# Patient Record
Sex: FEMALE | Race: ASIAN | NOT HISPANIC OR LATINO | Employment: FULL TIME | URBAN - METROPOLITAN AREA
[De-identification: names, ages, dates, MRNs, and addresses within clinical notes are randomized per-mention and may not be internally consistent; named-entity substitution may affect disease eponyms.]

---

## 2023-08-14 PROBLEM — E05.90 HYPERTHYROIDISM: Status: ACTIVE | Noted: 2023-08-14

## 2023-08-14 PROBLEM — M54.2 NECK PAIN: Status: ACTIVE | Noted: 2023-08-14

## 2023-08-14 PROBLEM — E78.5 HYPERLIPIDEMIA: Status: ACTIVE | Noted: 2023-08-14

## 2023-08-14 PROBLEM — H53.8 BLURRING OF VISUAL IMAGE: Status: ACTIVE | Noted: 2023-08-14

## 2023-08-14 PROBLEM — N39.0 RECURRENT UTI: Status: ACTIVE | Noted: 2018-10-24

## 2023-08-14 PROBLEM — R94.31 ABNORMAL EKG: Status: ACTIVE | Noted: 2023-08-14

## 2023-08-14 PROBLEM — R68.89 IMPAIRED EXERCISE TOLERANCE: Status: ACTIVE | Noted: 2023-08-14

## 2023-08-14 PROBLEM — N32.81 OVERACTIVE BLADDER: Status: ACTIVE | Noted: 2023-08-14

## 2023-08-14 PROBLEM — N95.1 MENOPAUSAL SYMPTOMS: Status: ACTIVE | Noted: 2023-08-14

## 2023-08-14 PROBLEM — D72.819 LEUKOPENIA: Status: ACTIVE | Noted: 2023-08-14

## 2023-08-14 PROBLEM — R06.02 SHORTNESS OF BREATH: Status: ACTIVE | Noted: 2023-08-14

## 2023-08-14 PROBLEM — R63.4 WEIGHT LOSS: Status: ACTIVE | Noted: 2023-08-14

## 2023-08-14 RX ORDER — METHIMAZOLE 10 MG/1
TABLET ORAL
COMMUNITY
Start: 2021-04-14 | End: 2024-03-11 | Stop reason: ALTCHOICE

## 2023-08-14 RX ORDER — ATENOLOL 25 MG/1
TABLET ORAL
COMMUNITY
Start: 2021-04-05 | End: 2024-03-11 | Stop reason: WASHOUT

## 2023-08-14 RX ORDER — ACETAMINOPHEN 500 MG
500 TABLET ORAL EVERY 8 HOURS PRN
COMMUNITY
Start: 2017-12-15 | End: 2024-03-11 | Stop reason: ALTCHOICE

## 2023-08-14 RX ORDER — ROSUVASTATIN CALCIUM 5 MG/1
1 TABLET, COATED ORAL DAILY
COMMUNITY
Start: 2022-10-18 | End: 2024-03-11 | Stop reason: WASHOUT

## 2023-08-15 ENCOUNTER — OFFICE VISIT (OUTPATIENT)
Dept: PRIMARY CARE | Facility: CLINIC | Age: 63
End: 2023-08-15
Payer: COMMERCIAL

## 2023-08-15 ENCOUNTER — LAB (OUTPATIENT)
Dept: LAB | Facility: LAB | Age: 63
End: 2023-08-15
Payer: COMMERCIAL

## 2023-08-15 VITALS
OXYGEN SATURATION: 97 % | SYSTOLIC BLOOD PRESSURE: 122 MMHG | HEART RATE: 74 BPM | WEIGHT: 180 LBS | HEIGHT: 67 IN | TEMPERATURE: 97.1 F | BODY MASS INDEX: 28.25 KG/M2 | DIASTOLIC BLOOD PRESSURE: 72 MMHG

## 2023-08-15 DIAGNOSIS — E05.90 HYPERTHYROIDISM: Primary | ICD-10-CM

## 2023-08-15 DIAGNOSIS — E78.5 HYPERLIPIDEMIA, UNSPECIFIED HYPERLIPIDEMIA TYPE: ICD-10-CM

## 2023-08-15 DIAGNOSIS — Z12.31 SCREENING MAMMOGRAM, ENCOUNTER FOR: ICD-10-CM

## 2023-08-15 DIAGNOSIS — E05.90 HYPERTHYROIDISM: ICD-10-CM

## 2023-08-15 PROCEDURE — 80053 COMPREHEN METABOLIC PANEL: CPT

## 2023-08-15 PROCEDURE — 99213 OFFICE O/P EST LOW 20 MIN: CPT | Performed by: INTERNAL MEDICINE

## 2023-08-15 PROCEDURE — 85025 COMPLETE CBC W/AUTO DIFF WBC: CPT

## 2023-08-15 PROCEDURE — 84443 ASSAY THYROID STIM HORMONE: CPT

## 2023-08-15 PROCEDURE — 80061 LIPID PANEL: CPT

## 2023-08-15 PROCEDURE — 36415 COLL VENOUS BLD VENIPUNCTURE: CPT

## 2023-08-15 NOTE — PROGRESS NOTES
"Subjective   Patient ID: Marian Garcia is a 63 y.o. female who presents for Follow-up (Pt is due for mammogram order).    HPI follow up leukopenia, hyperlipidemia, thyroid abnormalities  Adamant wants thyroid checked due to fatgue    Review of Systems  Some fatigue    Objective   /72   Pulse 74   Temp 36.2 °C (97.1 °F)   Ht 1.702 m (5' 7\")   Wt 81.6 kg (180 lb)   SpO2 97%   BMI 28.19 kg/m²     Physical Exam  Gen nad, affect wnl  Heentt eomfg, face symmetric, ncat  Neck w/o la, tm, bruit  Lungs clear   Cv rrr nl s1, s2  Ext w/o edema  Neuro grossly nonfocal  Skin good color    Assessment/Plan   Diagnoses and all orders for this visit:  Hyperthyroidism  -     Tsh With Reflex To Free T4 If Abnormal; Future  -     CBC and Auto Differential; Future  Hyperlipidemia, unspecified hyperlipidemia type  -     Comprehensive metabolic panel; Future  -     Lipid Panel; Future  Screening mammogram, encounter for  -     BI mammo bilateral screening tomosynthesis; Future     Labs  Mammo  Follow up specialists  Follow up 12 mos if labs/mammo ok  "

## 2023-08-16 DIAGNOSIS — R73.02 IGT (IMPAIRED GLUCOSE TOLERANCE): ICD-10-CM

## 2023-08-16 DIAGNOSIS — D75.89 MACROCYTOSIS: Primary | ICD-10-CM

## 2023-08-16 LAB
ALANINE AMINOTRANSFERASE (SGPT) (U/L) IN SER/PLAS: 16 U/L (ref 7–45)
ALBUMIN (G/DL) IN SER/PLAS: 4.8 G/DL (ref 3.4–5)
ALKALINE PHOSPHATASE (U/L) IN SER/PLAS: 69 U/L (ref 33–136)
ANION GAP IN SER/PLAS: 14 MMOL/L (ref 10–20)
ASPARTATE AMINOTRANSFERASE (SGOT) (U/L) IN SER/PLAS: 16 U/L (ref 9–39)
BASOPHILS (10*3/UL) IN BLOOD BY AUTOMATED COUNT: 0.02 X10E9/L (ref 0–0.1)
BASOPHILS/100 LEUKOCYTES IN BLOOD BY AUTOMATED COUNT: 0.5 % (ref 0–2)
BILIRUBIN TOTAL (MG/DL) IN SER/PLAS: 0.5 MG/DL (ref 0–1.2)
CALCIUM (MG/DL) IN SER/PLAS: 9.4 MG/DL (ref 8.6–10.6)
CARBON DIOXIDE, TOTAL (MMOL/L) IN SER/PLAS: 23 MMOL/L (ref 21–32)
CHLORIDE (MMOL/L) IN SER/PLAS: 108 MMOL/L (ref 98–107)
CHOLESTEROL (MG/DL) IN SER/PLAS: 230 MG/DL (ref 0–199)
CHOLESTEROL IN HDL (MG/DL) IN SER/PLAS: 62.5 MG/DL
CHOLESTEROL/HDL RATIO: 3.7
CREATININE (MG/DL) IN SER/PLAS: 0.81 MG/DL (ref 0.5–1.05)
EOSINOPHILS (10*3/UL) IN BLOOD BY AUTOMATED COUNT: 0.04 X10E9/L (ref 0–0.7)
EOSINOPHILS/100 LEUKOCYTES IN BLOOD BY AUTOMATED COUNT: 1 % (ref 0–6)
ERYTHROCYTE DISTRIBUTION WIDTH (RATIO) BY AUTOMATED COUNT: 12.5 % (ref 11.5–14.5)
ERYTHROCYTE MEAN CORPUSCULAR HEMOGLOBIN CONCENTRATION (G/DL) BY AUTOMATED: 31.7 G/DL (ref 32–36)
ERYTHROCYTE MEAN CORPUSCULAR VOLUME (FL) BY AUTOMATED COUNT: 101 FL (ref 80–100)
ERYTHROCYTES (10*6/UL) IN BLOOD BY AUTOMATED COUNT: 4.42 X10E12/L (ref 4–5.2)
GFR FEMALE: 82 ML/MIN/1.73M2
GLUCOSE (MG/DL) IN SER/PLAS: 105 MG/DL (ref 74–99)
HEMATOCRIT (%) IN BLOOD BY AUTOMATED COUNT: 44.5 % (ref 36–46)
HEMOGLOBIN (G/DL) IN BLOOD: 14.1 G/DL (ref 12–16)
IMMATURE GRANULOCYTES/100 LEUKOCYTES IN BLOOD BY AUTOMATED COUNT: 0.2 % (ref 0–0.9)
LDL: 152 MG/DL (ref 0–99)
LEUKOCYTES (10*3/UL) IN BLOOD BY AUTOMATED COUNT: 4.1 X10E9/L (ref 4.4–11.3)
LYMPHOCYTES (10*3/UL) IN BLOOD BY AUTOMATED COUNT: 1.35 X10E9/L (ref 1.2–4.8)
LYMPHOCYTES/100 LEUKOCYTES IN BLOOD BY AUTOMATED COUNT: 32.9 % (ref 13–44)
MONOCYTES (10*3/UL) IN BLOOD BY AUTOMATED COUNT: 0.31 X10E9/L (ref 0.1–1)
MONOCYTES/100 LEUKOCYTES IN BLOOD BY AUTOMATED COUNT: 7.6 % (ref 2–10)
NEUTROPHILS (10*3/UL) IN BLOOD BY AUTOMATED COUNT: 2.37 X10E9/L (ref 1.2–7.7)
NEUTROPHILS/100 LEUKOCYTES IN BLOOD BY AUTOMATED COUNT: 57.8 % (ref 40–80)
NRBC (PER 100 WBCS) BY AUTOMATED COUNT: 0 /100 WBC (ref 0–0)
PLATELETS (10*3/UL) IN BLOOD AUTOMATED COUNT: 232 X10E9/L (ref 150–450)
POTASSIUM (MMOL/L) IN SER/PLAS: 4.1 MMOL/L (ref 3.5–5.3)
PROTEIN TOTAL: 7.5 G/DL (ref 6.4–8.2)
SODIUM (MMOL/L) IN SER/PLAS: 141 MMOL/L (ref 136–145)
THYROTROPIN (MIU/L) IN SER/PLAS BY DETECTION LIMIT <= 0.05 MIU/L: 2.05 MIU/L (ref 0.44–3.98)
TRIGLYCERIDE (MG/DL) IN SER/PLAS: 76 MG/DL (ref 0–149)
UREA NITROGEN (MG/DL) IN SER/PLAS: 16 MG/DL (ref 6–23)
VLDL: 15 MG/DL (ref 0–40)

## 2023-09-01 ENCOUNTER — LAB (OUTPATIENT)
Dept: LAB | Facility: LAB | Age: 63
End: 2023-09-01
Payer: COMMERCIAL

## 2023-09-01 DIAGNOSIS — D75.89 MACROCYTOSIS: ICD-10-CM

## 2023-09-01 DIAGNOSIS — R73.02 IGT (IMPAIRED GLUCOSE TOLERANCE): ICD-10-CM

## 2023-09-01 LAB
BASOPHILS (10*3/UL) IN BLOOD BY AUTOMATED COUNT: 0.01 X10E9/L (ref 0–0.1)
BASOPHILS/100 LEUKOCYTES IN BLOOD BY AUTOMATED COUNT: 0.2 % (ref 0–2)
EOSINOPHILS (10*3/UL) IN BLOOD BY AUTOMATED COUNT: 0.04 X10E9/L (ref 0–0.7)
EOSINOPHILS/100 LEUKOCYTES IN BLOOD BY AUTOMATED COUNT: 1 % (ref 0–6)
ERYTHROCYTE DISTRIBUTION WIDTH (RATIO) BY AUTOMATED COUNT: 12.9 % (ref 11.5–14.5)
ERYTHROCYTE MEAN CORPUSCULAR HEMOGLOBIN CONCENTRATION (G/DL) BY AUTOMATED: 31.7 G/DL (ref 32–36)
ERYTHROCYTE MEAN CORPUSCULAR VOLUME (FL) BY AUTOMATED COUNT: 100 FL (ref 80–100)
ERYTHROCYTES (10*6/UL) IN BLOOD BY AUTOMATED COUNT: 4.56 X10E12/L (ref 4–5.2)
ESTIMATED AVERAGE GLUCOSE FOR HBA1C: 114 MG/DL
HEMATOCRIT (%) IN BLOOD BY AUTOMATED COUNT: 45.4 % (ref 36–46)
HEMOGLOBIN (G/DL) IN BLOOD: 14.4 G/DL (ref 12–16)
HEMOGLOBIN (PG) IN RETICULOCYTES: 35 PG (ref 28–38)
HEMOGLOBIN A1C/HEMOGLOBIN TOTAL IN BLOOD: 5.6 %
IMMATURE GRANULOCYTES/100 LEUKOCYTES IN BLOOD BY AUTOMATED COUNT: 0.2 % (ref 0–0.9)
IMMATURE RETIC FRACTION: 8 % (ref 0–16)
LEUKOCYTES (10*3/UL) IN BLOOD BY AUTOMATED COUNT: 4.2 X10E9/L (ref 4.4–11.3)
LYMPHOCYTES (10*3/UL) IN BLOOD BY AUTOMATED COUNT: 1.56 X10E9/L (ref 1.2–4.8)
LYMPHOCYTES/100 LEUKOCYTES IN BLOOD BY AUTOMATED COUNT: 37.6 % (ref 13–44)
MONOCYTES (10*3/UL) IN BLOOD BY AUTOMATED COUNT: 0.3 X10E9/L (ref 0.1–1)
MONOCYTES/100 LEUKOCYTES IN BLOOD BY AUTOMATED COUNT: 7.2 % (ref 2–10)
NEUTROPHILS (10*3/UL) IN BLOOD BY AUTOMATED COUNT: 2.23 X10E9/L (ref 1.2–7.7)
NEUTROPHILS/100 LEUKOCYTES IN BLOOD BY AUTOMATED COUNT: 53.8 % (ref 40–80)
NRBC (PER 100 WBCS) BY AUTOMATED COUNT: 0 /100 WBC (ref 0–0)
PLATELETS (10*3/UL) IN BLOOD AUTOMATED COUNT: 229 X10E9/L (ref 150–450)
RETICULOCYTES (10*3/UL) IN BLOOD: 0.09 X10E12/L (ref 0.02–0.08)
RETICULOCYTES/100 ERYTHROCYTES IN BLOOD BY AUTOMATED COUNT: 1.9 % (ref 0.5–2)

## 2023-09-01 PROCEDURE — 82607 VITAMIN B-12: CPT

## 2023-09-01 PROCEDURE — 83036 HEMOGLOBIN GLYCOSYLATED A1C: CPT

## 2023-09-01 PROCEDURE — 84165 PROTEIN E-PHORESIS SERUM: CPT

## 2023-09-01 PROCEDURE — 84165 PROTEIN E-PHORESIS SERUM: CPT | Performed by: PATHOLOGY

## 2023-09-01 PROCEDURE — 82746 ASSAY OF FOLIC ACID SERUM: CPT

## 2023-09-01 PROCEDURE — 85025 COMPLETE CBC W/AUTO DIFF WBC: CPT

## 2023-09-01 PROCEDURE — 36415 COLL VENOUS BLD VENIPUNCTURE: CPT

## 2023-09-01 PROCEDURE — 85045 AUTOMATED RETICULOCYTE COUNT: CPT

## 2023-09-02 LAB
COBALAMIN (VITAMIN B12) (PG/ML) IN SER/PLAS: 504 PG/ML (ref 211–911)
FOLATE (NG/ML) IN SER/PLAS: 24 NG/ML

## 2023-09-06 LAB
ALBUMIN ELP: 4.6 G/DL (ref 3.4–5)
ALPHA 1: 0.2 G/DL (ref 0.2–0.6)
ALPHA 2: 0.6 G/DL (ref 0.4–1.1)
BETA: 0.8 G/DL (ref 0.5–1.2)
GAMMA GLOBULIN: 1 G/DL (ref 0.5–1.4)
PATH REVIEW-SERUM PROTEIN ELECTROPHORESIS: NORMAL
PROTEIN ELECTROPHORESIS INTERPRETATION: NORMAL
PROTEIN TOTAL: 7.2 G/DL (ref 6.4–8.2)

## 2024-01-18 ENCOUNTER — APPOINTMENT (OUTPATIENT)
Dept: ENDOCRINOLOGY | Facility: CLINIC | Age: 64
End: 2024-01-18
Payer: COMMERCIAL

## 2024-01-18 ENCOUNTER — TELEPHONE (OUTPATIENT)
Dept: PRIMARY CARE | Facility: CLINIC | Age: 64
End: 2024-01-18

## 2024-03-04 ENCOUNTER — TELEPHONE (OUTPATIENT)
Dept: PRIMARY CARE | Facility: CLINIC | Age: 64
End: 2024-03-04
Payer: COMMERCIAL

## 2024-03-04 DIAGNOSIS — E05.90 HYPERTHYROIDISM: Primary | ICD-10-CM

## 2024-03-05 ENCOUNTER — LAB (OUTPATIENT)
Dept: LAB | Facility: LAB | Age: 64
End: 2024-03-05
Payer: COMMERCIAL

## 2024-03-05 DIAGNOSIS — E05.90 HYPERTHYROIDISM: ICD-10-CM

## 2024-03-05 PROCEDURE — 36415 COLL VENOUS BLD VENIPUNCTURE: CPT

## 2024-03-05 PROCEDURE — 84481 FREE ASSAY (FT-3): CPT

## 2024-03-05 PROCEDURE — 84439 ASSAY OF FREE THYROXINE: CPT

## 2024-03-05 PROCEDURE — 84443 ASSAY THYROID STIM HORMONE: CPT

## 2024-03-06 LAB
T3FREE SERPL-MCNC: 3.3 PG/ML (ref 2.3–4.2)
T4 FREE SERPL-MCNC: 1.08 NG/DL (ref 0.78–1.48)
TSH SERPL-ACNC: 2.31 MIU/L (ref 0.44–3.98)

## 2024-03-11 ENCOUNTER — OFFICE VISIT (OUTPATIENT)
Dept: ENDOCRINOLOGY | Facility: CLINIC | Age: 64
End: 2024-03-11
Payer: COMMERCIAL

## 2024-03-11 ENCOUNTER — APPOINTMENT (OUTPATIENT)
Dept: ENDOCRINOLOGY | Facility: CLINIC | Age: 64
End: 2024-03-11
Payer: COMMERCIAL

## 2024-03-11 VITALS
SYSTOLIC BLOOD PRESSURE: 110 MMHG | HEIGHT: 67 IN | BODY MASS INDEX: 27.91 KG/M2 | DIASTOLIC BLOOD PRESSURE: 72 MMHG | WEIGHT: 177.8 LBS | RESPIRATION RATE: 16 BRPM | HEART RATE: 101 BPM

## 2024-03-11 DIAGNOSIS — E05.90 HYPERTHYROIDISM: Primary | ICD-10-CM

## 2024-03-11 PROCEDURE — 99213 OFFICE O/P EST LOW 20 MIN: CPT | Performed by: INTERNAL MEDICINE

## 2024-03-11 ASSESSMENT — ENCOUNTER SYMPTOMS
NAUSEA: 0
DIARRHEA: 0
FATIGUE: 0
HEADACHES: 0
FEVER: 0
CHILLS: 0
COUGH: 0
VOMITING: 0
SHORTNESS OF BREATH: 0
PALPITATIONS: 0

## 2024-03-11 NOTE — PATIENT INSTRUCTIONS
Your thyroid blood work is normal  Follow up with pcp for yearly thyroid labs with physical  Follow up with me as needed

## 2024-03-11 NOTE — PROGRESS NOTES
Endocrinology: Follow up visit  Subjective   Patient ID: Marian Garcia is a 63 y.o. female who presents for Hyperthyroidism.    PCP: Breezy Burns MD    HPI  Hx recurrent graves last episode resolved in 11/2021.  Since last visit doing fine.  No complaints.  Feels well.  No recent health issues.    Review of Systems   Constitutional:  Negative for chills, fatigue and fever.   Respiratory:  Negative for cough and shortness of breath.    Cardiovascular:  Negative for chest pain and palpitations.   Gastrointestinal:  Negative for diarrhea, nausea and vomiting.   Neurological:  Negative for headaches.       Patient Active Problem List   Diagnosis    Abnormal EKG    Blurring of visual image    Hyperlipidemia    Hyperthyroidism    Impaired exercise tolerance    Leukopenia    Menopausal symptoms    Neck pain    Overactive bladder    Recurrent UTI    Shortness of breath    Weight loss        Home Meds:  No current outpatient medications       No Known Allergies     Objective   Vitals:    03/11/24 1422   BP: 110/72   Pulse: 101   Resp: 16      Vitals:    03/11/24 1422   Weight: 80.6 kg (177 lb 12.8 oz)      Body mass index is 27.85 kg/m².   Physical Exam  Constitutional:       Appearance: Normal appearance. She is overweight.   HENT:      Head: Normocephalic and atraumatic.   Neck:      Thyroid: No thyroid mass, thyromegaly or thyroid tenderness.   Cardiovascular:      Rate and Rhythm: Normal rate and regular rhythm.      Heart sounds: No murmur heard.     No gallop.   Pulmonary:      Effort: Pulmonary effort is normal.      Breath sounds: Normal breath sounds.   Abdominal:      Palpations: Abdomen is soft.      Comments: benign   Neurological:      General: No focal deficit present.      Mental Status: She is alert and oriented to person, place, and time.      Deep Tendon Reflexes: Reflexes are normal and symmetric.   Psychiatric:         Behavior: Behavior is cooperative.         Labs:  Lab Results   Component Value Date     HGBA1C 5.6 09/01/2023    TSH 2.31 03/05/2024    FREET4 1.08 03/05/2024      Lab Results   Component Value Date    THYROIDPAB 580 (A) 04/05/2021    TSI 1.8 (H) 12/28/2021        Assessment/Plan   Problem List Items Addressed This Visit       Hyperthyroidism - Primary     Discussed thyroid labs: normal  No signs/symptoms of recurrence  Ok to return to yearly follo wup  with pcp     Electronically signed by:  Graciela Sorto MD 03/11/24 4:19 PM

## 2024-07-31 ENCOUNTER — OFFICE VISIT (OUTPATIENT)
Dept: PRIMARY CARE | Facility: CLINIC | Age: 64
End: 2024-07-31
Payer: COMMERCIAL

## 2024-07-31 ENCOUNTER — LAB (OUTPATIENT)
Dept: LAB | Facility: LAB | Age: 64
End: 2024-07-31
Payer: COMMERCIAL

## 2024-07-31 VITALS
HEART RATE: 76 BPM | DIASTOLIC BLOOD PRESSURE: 70 MMHG | OXYGEN SATURATION: 97 % | WEIGHT: 176 LBS | SYSTOLIC BLOOD PRESSURE: 118 MMHG | BODY MASS INDEX: 27.57 KG/M2

## 2024-07-31 DIAGNOSIS — D72.819 LEUKOPENIA, UNSPECIFIED TYPE: ICD-10-CM

## 2024-07-31 DIAGNOSIS — E78.5 HYPERLIPIDEMIA, UNSPECIFIED HYPERLIPIDEMIA TYPE: ICD-10-CM

## 2024-07-31 DIAGNOSIS — R35.1 NOCTURIA: ICD-10-CM

## 2024-07-31 DIAGNOSIS — Z12.31 SCREENING MAMMOGRAM FOR BREAST CANCER: ICD-10-CM

## 2024-07-31 DIAGNOSIS — E05.90 HYPERTHYROIDISM: ICD-10-CM

## 2024-07-31 DIAGNOSIS — R68.89 IMPAIRED EXERCISE TOLERANCE: ICD-10-CM

## 2024-07-31 DIAGNOSIS — Z86.010 HX OF COLONIC POLYPS: Primary | ICD-10-CM

## 2024-07-31 DIAGNOSIS — Z00.00 WELLNESS EXAMINATION: ICD-10-CM

## 2024-07-31 PROCEDURE — 36415 COLL VENOUS BLD VENIPUNCTURE: CPT

## 2024-07-31 PROCEDURE — 85025 COMPLETE CBC W/AUTO DIFF WBC: CPT

## 2024-07-31 PROCEDURE — 84165 PROTEIN E-PHORESIS SERUM: CPT

## 2024-07-31 PROCEDURE — 84443 ASSAY THYROID STIM HORMONE: CPT

## 2024-07-31 PROCEDURE — 81003 URINALYSIS AUTO W/O SCOPE: CPT

## 2024-07-31 PROCEDURE — 86334 IMMUNOFIX E-PHORESIS SERUM: CPT

## 2024-07-31 PROCEDURE — 99396 PREV VISIT EST AGE 40-64: CPT | Performed by: INTERNAL MEDICINE

## 2024-07-31 PROCEDURE — 80061 LIPID PANEL: CPT

## 2024-07-31 PROCEDURE — 84439 ASSAY OF FREE THYROXINE: CPT

## 2024-07-31 PROCEDURE — 83036 HEMOGLOBIN GLYCOSYLATED A1C: CPT

## 2024-07-31 PROCEDURE — 84480 ASSAY TRIIODOTHYRONINE (T3): CPT

## 2024-07-31 PROCEDURE — 1036F TOBACCO NON-USER: CPT | Performed by: INTERNAL MEDICINE

## 2024-07-31 PROCEDURE — 80053 COMPREHEN METABOLIC PANEL: CPT

## 2024-07-31 PROCEDURE — 84155 ASSAY OF PROTEIN SERUM: CPT

## 2024-07-31 NOTE — PROGRESS NOTES
Subjective   Patient ID: Marian Garcia is a 64 y.o. female who presents for Annual Exam.    HPI wellness exam.   Fatigue  Left knee pain. Says had xrays at [facility] in June. Told oa.   Seeing endo dr berger.  No cp, sob, palps, bleeding, depression, anxiety, significant abd pain  No snore  Nu x3 . No hu, du    Review of Systems  As above    Objective   /70   Pulse 76   Wt 79.8 kg (176 lb)   SpO2 97%   BMI 27.57 kg/m²     Physical Exam  Gen nad, affect wnl  Heentt eomfg, face symmetric, ncat  Neck w/o la, tm, bruit  Lungs clear   Cv rrr nl s1, s2  Ext w/o edema  Neuro grossly nonfocal  Skin good color  Reviewed endo labs    Assessment/Plan   Diagnoses and all orders for this visit:  Hx of colonic polyps  Hyperlipidemia, unspecified hyperlipidemia type  -     Lipid Panel; Future  Impaired exercise tolerance  Hyperthyroidism  -     TSH; Future  -     T3; Future  -     T4, free; Future  Leukopenia, unspecified type  -     CBC and Auto Differential; Future  -     Serum Protein Electrophoresis; Future  Nocturia  -     Urinalysis with Reflex Microscopic; Future  Screening mammogram for breast cancer  -     BI mammo bilateral screening tomosynthesis; Future

## 2024-08-01 DIAGNOSIS — D69.6 THROMBOCYTOPENIA (CMS-HCC): ICD-10-CM

## 2024-08-01 DIAGNOSIS — D72.819 LEUKOPENIA, UNSPECIFIED TYPE: Primary | ICD-10-CM

## 2024-08-01 LAB
ALBUMIN SERPL BCP-MCNC: 4.7 G/DL (ref 3.4–5)
ALP SERPL-CCNC: 61 U/L (ref 33–136)
ALT SERPL W P-5'-P-CCNC: 14 U/L (ref 7–45)
ANION GAP SERPL CALC-SCNC: 14 MMOL/L (ref 10–20)
APPEARANCE UR: CLEAR
AST SERPL W P-5'-P-CCNC: 19 U/L (ref 9–39)
BASOPHILS # BLD AUTO: 0.01 X10*3/UL (ref 0–0.1)
BASOPHILS NFR BLD AUTO: 0.3 %
BILIRUB SERPL-MCNC: 0.7 MG/DL (ref 0–1.2)
BILIRUB UR STRIP.AUTO-MCNC: NEGATIVE MG/DL
BUN SERPL-MCNC: 18 MG/DL (ref 6–23)
CALCIUM SERPL-MCNC: 9.4 MG/DL (ref 8.6–10.6)
CHLORIDE SERPL-SCNC: 104 MMOL/L (ref 98–107)
CHOLEST SERPL-MCNC: 289 MG/DL (ref 0–199)
CHOLESTEROL/HDL RATIO: 4.7
CO2 SERPL-SCNC: 25 MMOL/L (ref 21–32)
COLOR UR: NORMAL
CREAT SERPL-MCNC: 0.71 MG/DL (ref 0.5–1.05)
EGFRCR SERPLBLD CKD-EPI 2021: >90 ML/MIN/1.73M*2
EOSINOPHIL # BLD AUTO: 0.03 X10*3/UL (ref 0–0.7)
EOSINOPHIL NFR BLD AUTO: 0.9 %
ERYTHROCYTE [DISTWIDTH] IN BLOOD BY AUTOMATED COUNT: 12.2 % (ref 11.5–14.5)
EST. AVERAGE GLUCOSE BLD GHB EST-MCNC: 105 MG/DL
GLUCOSE SERPL-MCNC: 113 MG/DL (ref 74–99)
GLUCOSE UR STRIP.AUTO-MCNC: NORMAL MG/DL
HBA1C MFR BLD: 5.3 %
HCT VFR BLD AUTO: 42.9 % (ref 36–46)
HDLC SERPL-MCNC: 61.8 MG/DL
HGB BLD-MCNC: 13.7 G/DL (ref 12–16)
IMM GRANULOCYTES # BLD AUTO: 0 X10*3/UL (ref 0–0.7)
IMM GRANULOCYTES NFR BLD AUTO: 0 % (ref 0–0.9)
KETONES UR STRIP.AUTO-MCNC: NEGATIVE MG/DL
LDLC SERPL CALC-MCNC: 208 MG/DL
LEUKOCYTE ESTERASE UR QL STRIP.AUTO: NEGATIVE
LYMPHOCYTES # BLD AUTO: 1.34 X10*3/UL (ref 1.2–4.8)
LYMPHOCYTES NFR BLD AUTO: 40.7 %
MCH RBC QN AUTO: 31.6 PG (ref 26–34)
MCHC RBC AUTO-ENTMCNC: 31.9 G/DL (ref 32–36)
MCV RBC AUTO: 99 FL (ref 80–100)
MONOCYTES # BLD AUTO: 0.32 X10*3/UL (ref 0.1–1)
MONOCYTES NFR BLD AUTO: 9.7 %
NEUTROPHILS # BLD AUTO: 1.59 X10*3/UL (ref 1.2–7.7)
NEUTROPHILS NFR BLD AUTO: 48.4 %
NITRITE UR QL STRIP.AUTO: NEGATIVE
NON HDL CHOLESTEROL: 227 MG/DL (ref 0–149)
NRBC BLD-RTO: 0 /100 WBCS (ref 0–0)
PH UR STRIP.AUTO: 6.5 [PH]
PLATELET # BLD AUTO: 106 X10*3/UL (ref 150–450)
POTASSIUM SERPL-SCNC: 4.7 MMOL/L (ref 3.5–5.3)
PROT SERPL-MCNC: 7.2 G/DL (ref 6.4–8.2)
PROT SERPL-MCNC: 7.2 G/DL (ref 6.4–8.2)
PROT UR STRIP.AUTO-MCNC: NEGATIVE MG/DL
RBC # BLD AUTO: 4.33 X10*6/UL (ref 4–5.2)
RBC # UR STRIP.AUTO: NEGATIVE /UL
SODIUM SERPL-SCNC: 138 MMOL/L (ref 136–145)
SP GR UR STRIP.AUTO: 1.01
T3 SERPL-MCNC: 95 NG/DL (ref 60–200)
T4 FREE SERPL-MCNC: 1.39 NG/DL (ref 0.78–1.48)
TRIGL SERPL-MCNC: 94 MG/DL (ref 0–149)
TSH SERPL-ACNC: 1.86 MIU/L (ref 0.44–3.98)
UROBILINOGEN UR STRIP.AUTO-MCNC: NORMAL MG/DL
VLDL: 19 MG/DL (ref 0–40)
WBC # BLD AUTO: 3.3 X10*3/UL (ref 4.4–11.3)

## 2024-08-06 LAB
ALBUMIN: 4.6 G/DL (ref 3.4–5)
ALPHA 1 GLOBULIN: 0.2 G/DL (ref 0.2–0.6)
ALPHA 2 GLOBULIN: 0.6 G/DL (ref 0.4–1.1)
BETA GLOBULIN: 0.8 G/DL (ref 0.5–1.2)
GAMMA GLOBULIN: 1 G/DL (ref 0.5–1.4)
IMMUNOFIXATION COMMENT: NORMAL
PATH REVIEW - SERUM IMMUNOFIXATION: NORMAL
PATH REVIEW-SERUM PROTEIN ELECTROPHORESIS: NORMAL
PROTEIN ELECTROPHORESIS COMMENT: NORMAL

## 2024-08-19 ENCOUNTER — APPOINTMENT (OUTPATIENT)
Dept: PRIMARY CARE | Facility: CLINIC | Age: 64
End: 2024-08-19
Payer: COMMERCIAL

## 2024-08-26 ENCOUNTER — APPOINTMENT (OUTPATIENT)
Dept: PRIMARY CARE | Facility: CLINIC | Age: 64
End: 2024-08-26
Payer: COMMERCIAL

## 2024-10-23 ENCOUNTER — OFFICE VISIT (OUTPATIENT)
Dept: HEMATOLOGY/ONCOLOGY | Facility: CLINIC | Age: 64
End: 2024-10-23
Payer: COMMERCIAL

## 2024-10-23 VITALS
SYSTOLIC BLOOD PRESSURE: 112 MMHG | DIASTOLIC BLOOD PRESSURE: 78 MMHG | TEMPERATURE: 97 F | HEART RATE: 69 BPM | OXYGEN SATURATION: 95 % | HEIGHT: 66 IN | WEIGHT: 175.82 LBS | RESPIRATION RATE: 17 BRPM | BODY MASS INDEX: 28.26 KG/M2

## 2024-10-23 DIAGNOSIS — T14.8XXA BRUISE: Primary | ICD-10-CM

## 2024-10-23 DIAGNOSIS — R10.9 ABDOMINAL PAIN, UNSPECIFIED ABDOMINAL LOCATION: ICD-10-CM

## 2024-10-23 DIAGNOSIS — R06.02 SOB (SHORTNESS OF BREATH): ICD-10-CM

## 2024-10-23 DIAGNOSIS — D72.819 LEUKOPENIA, UNSPECIFIED TYPE: ICD-10-CM

## 2024-10-23 DIAGNOSIS — R19.7 DIARRHEA, UNSPECIFIED TYPE: ICD-10-CM

## 2024-10-23 DIAGNOSIS — D69.6 THROMBOCYTOPENIA (CMS-HCC): ICD-10-CM

## 2024-10-23 LAB
ALBUMIN SERPL BCP-MCNC: 4.1 G/DL (ref 3.4–5)
ALP SERPL-CCNC: 58 U/L (ref 33–136)
ALT SERPL W P-5'-P-CCNC: 13 U/L (ref 7–45)
ANION GAP SERPL CALC-SCNC: 12 MMOL/L (ref 10–20)
APTT PPP: 34 SECONDS (ref 27–38)
AST SERPL W P-5'-P-CCNC: 13 U/L (ref 9–39)
BASOPHILS # BLD AUTO: 0.02 X10*3/UL (ref 0–0.1)
BASOPHILS NFR BLD AUTO: 0.5 %
BILIRUB SERPL-MCNC: 0.6 MG/DL (ref 0–1.2)
BUN SERPL-MCNC: 16 MG/DL (ref 6–23)
CALCIUM SERPL-MCNC: 9 MG/DL (ref 8.6–10.3)
CHLORIDE SERPL-SCNC: 107 MMOL/L (ref 98–107)
CO2 SERPL-SCNC: 25 MMOL/L (ref 21–32)
CREAT SERPL-MCNC: 0.71 MG/DL (ref 0.5–1.05)
CRP SERPL-MCNC: 0.19 MG/DL
EGFRCR SERPLBLD CKD-EPI 2021: >90 ML/MIN/1.73M*2
EOSINOPHIL # BLD AUTO: 0.03 X10*3/UL (ref 0–0.7)
EOSINOPHIL NFR BLD AUTO: 0.8 %
ERYTHROCYTE [DISTWIDTH] IN BLOOD BY AUTOMATED COUNT: 12.3 % (ref 11.5–14.5)
ERYTHROCYTE [SEDIMENTATION RATE] IN BLOOD BY WESTERGREN METHOD: 9 MM/H (ref 0–30)
FERRITIN SERPL-MCNC: 195 NG/ML (ref 8–150)
GLUCOSE SERPL-MCNC: 107 MG/DL (ref 74–99)
HCT VFR BLD AUTO: 41.9 % (ref 36–46)
HGB BLD-MCNC: 13.3 G/DL (ref 12–16)
HGB RETIC QN: 36 PG (ref 28–38)
IMM GRANULOCYTES # BLD AUTO: 0 X10*3/UL (ref 0–0.7)
IMM GRANULOCYTES NFR BLD AUTO: 0 % (ref 0–0.9)
IMMATURE RETIC FRACTION: 9.2 %
INR PPP: 1 (ref 0.9–1.1)
IRON SATN MFR SERPL: 40 % (ref 25–45)
IRON SERPL-MCNC: 116 UG/DL (ref 35–150)
LYMPHOCYTES # BLD AUTO: 1.27 X10*3/UL (ref 1.2–4.8)
LYMPHOCYTES NFR BLD AUTO: 34.8 %
MCH RBC QN AUTO: 31.2 PG (ref 26–34)
MCHC RBC AUTO-ENTMCNC: 31.7 G/DL (ref 32–36)
MCV RBC AUTO: 98 FL (ref 80–100)
MONOCYTES # BLD AUTO: 0.3 X10*3/UL (ref 0.1–1)
MONOCYTES NFR BLD AUTO: 8.2 %
NEUTROPHILS # BLD AUTO: 2.03 X10*3/UL (ref 1.2–7.7)
NEUTROPHILS NFR BLD AUTO: 55.7 %
NRBC BLD-RTO: ABNORMAL /100{WBCS}
PLATELET # BLD AUTO: 215 X10*3/UL (ref 150–450)
POTASSIUM SERPL-SCNC: 3.7 MMOL/L (ref 3.5–5.3)
PROT SERPL-MCNC: 6.8 G/DL (ref 6.4–8.2)
PROTHROMBIN TIME: 10.9 SECONDS (ref 9.8–12.8)
RBC # BLD AUTO: 4.26 X10*6/UL (ref 4–5.2)
RETICS #: 0.07 X10*6/UL (ref 0.02–0.08)
RETICS/RBC NFR AUTO: 1.6 % (ref 0.5–2)
SODIUM SERPL-SCNC: 140 MMOL/L (ref 136–145)
TIBC SERPL-MCNC: 291 UG/DL (ref 240–445)
UIBC SERPL-MCNC: 175 UG/DL (ref 110–370)
WBC # BLD AUTO: 3.7 X10*3/UL (ref 4.4–11.3)

## 2024-10-23 PROCEDURE — 36415 COLL VENOUS BLD VENIPUNCTURE: CPT

## 2024-10-23 PROCEDURE — 86644 CMV ANTIBODY: CPT

## 2024-10-23 PROCEDURE — 82607 VITAMIN B-12: CPT

## 2024-10-23 PROCEDURE — 86663 EPSTEIN-BARR ANTIBODY: CPT

## 2024-10-23 PROCEDURE — 82746 ASSAY OF FOLIC ACID SERUM: CPT

## 2024-10-23 PROCEDURE — 85025 COMPLETE CBC W/AUTO DIFF WBC: CPT

## 2024-10-23 PROCEDURE — 86038 ANTINUCLEAR ANTIBODIES: CPT

## 2024-10-23 PROCEDURE — 82728 ASSAY OF FERRITIN: CPT

## 2024-10-23 PROCEDURE — 83090 ASSAY OF HOMOCYSTEINE: CPT | Mod: GEALAB

## 2024-10-23 PROCEDURE — 3008F BODY MASS INDEX DOCD: CPT

## 2024-10-23 PROCEDURE — 85652 RBC SED RATE AUTOMATED: CPT

## 2024-10-23 PROCEDURE — 82525 ASSAY OF COPPER: CPT

## 2024-10-23 PROCEDURE — 86140 C-REACTIVE PROTEIN: CPT

## 2024-10-23 PROCEDURE — 86235 NUCLEAR ANTIGEN ANTIBODY: CPT

## 2024-10-23 PROCEDURE — 85045 AUTOMATED RETICULOCYTE COUNT: CPT

## 2024-10-23 PROCEDURE — 99205 OFFICE O/P NEW HI 60 MIN: CPT

## 2024-10-23 PROCEDURE — 86704 HEP B CORE ANTIBODY TOTAL: CPT

## 2024-10-23 PROCEDURE — 86645 CMV ANTIBODY IGM: CPT

## 2024-10-23 PROCEDURE — 87340 HEPATITIS B SURFACE AG IA: CPT

## 2024-10-23 PROCEDURE — 83540 ASSAY OF IRON: CPT

## 2024-10-23 PROCEDURE — 80053 COMPREHEN METABOLIC PANEL: CPT

## 2024-10-23 PROCEDURE — 86705 HEP B CORE ANTIBODY IGM: CPT

## 2024-10-23 PROCEDURE — 99215 OFFICE O/P EST HI 40 MIN: CPT

## 2024-10-23 PROCEDURE — 85610 PROTHROMBIN TIME: CPT

## 2024-10-23 PROCEDURE — 86803 HEPATITIS C AB TEST: CPT

## 2024-10-23 PROCEDURE — 87389 HIV-1 AG W/HIV-1&-2 AB AG IA: CPT | Mod: GEALAB

## 2024-10-23 SDOH — ECONOMIC STABILITY: FOOD INSECURITY: WITHIN THE PAST 12 MONTHS, THE FOOD YOU BOUGHT JUST DIDN'T LAST AND YOU DIDN'T HAVE MONEY TO GET MORE.: NEVER TRUE

## 2024-10-23 SDOH — ECONOMIC STABILITY: FOOD INSECURITY: WITHIN THE PAST 12 MONTHS, YOU WORRIED THAT YOUR FOOD WOULD RUN OUT BEFORE YOU GOT THE MONEY TO BUY MORE.: NEVER TRUE

## 2024-10-23 ASSESSMENT — PATIENT HEALTH QUESTIONNAIRE - PHQ9
2. FEELING DOWN, DEPRESSED OR HOPELESS: NOT AT ALL
SUM OF ALL RESPONSES TO PHQ9 QUESTIONS 1 AND 2: 0
1. LITTLE INTEREST OR PLEASURE IN DOING THINGS: NOT AT ALL

## 2024-10-23 ASSESSMENT — COLUMBIA-SUICIDE SEVERITY RATING SCALE - C-SSRS
6. HAVE YOU EVER DONE ANYTHING, STARTED TO DO ANYTHING, OR PREPARED TO DO ANYTHING TO END YOUR LIFE?: NO
2. HAVE YOU ACTUALLY HAD ANY THOUGHTS OF KILLING YOURSELF?: NO
1. IN THE PAST MONTH, HAVE YOU WISHED YOU WERE DEAD OR WISHED YOU COULD GO TO SLEEP AND NOT WAKE UP?: NO

## 2024-10-23 ASSESSMENT — ENCOUNTER SYMPTOMS
OCCASIONAL FEELINGS OF UNSTEADINESS: 0
LOSS OF SENSATION IN FEET: 0
DEPRESSION: 0

## 2024-10-23 ASSESSMENT — PAIN SCALES - GENERAL: PAINLEVEL_OUTOF10: 0-NO PAIN

## 2024-10-23 NOTE — PROGRESS NOTES
"[facility]    PATIENT VISIT INFORMATION    Visit Type: New Visit    Referring Provider: Breezy Burns MD  Reason for referral: Leukopenia  Primary Practice Provider: Breezy Burns MD    HEMATOLOGY HISTORY    64-year-old female presents for evaluation of leukopenia.  Upon review of patient's medical records mild leukopenia dating back to 12/27/2019 and one episode of thrombocytopenia 2 months ago.  8/22/2023 bilateral mammogram completed with no signs of malignancy.  2/6/2023 colonoscopy performed and reported as unremarkable.  Patient does not take any medications though most recently noted hypercholesteremia.  HISTORY OF PRESENT ILLNESS     ID Statement: Marian Garcia is a 64-year-old female    Chief Complaint: \"Really tiered and short of breath\"     Interval History:   Patient presents for evaluation of leukopenia accompanied by adult son.  She states she is really tired all the time.  Notes nocturia which prevents her from getting a good night sleep. SOB while I walk. \"Cannot catch my breathe.\" No CP or palpitations. No leg swelling. No rashes or sores. Easily bruise and bleed. \"Increase in red spots on body though I have had a my whole life.\" Abdominal pain, causes use of bathroom, loose stool. Colonoscopy. Cannot eat spicy. No n/v/c, no blood or melena. No hematuria. Knee pain from arthritis. Sometimes back pain with housework.   Low energy, fatigue. Always feel tiered. Gained weight. Use to walk a lot. Low appetite, eats two meals per day. No Lymphadenopathy or neuropathy. Hot intolerance. Kendra fever, drenching night sweats, chills, illness or infection. No neurological symptoms other than some dizziness.   PAST/CURRENT HISTORY     MEDICAL/SURGICAL HISTORY  -Hypercholesteremia  -Hypothyroidism though not on medication over the last year  -Menopausal symptoms  -Leukopenia  -Abnormal EKG-  -shortness of breath    SOCIAL HISTORY  -Lives with  (two healthy children) " "  -Work place: Housewife   -Tobacco/smokeless use: Denies    -Alcohol: Denies   -Illicit drug or marijuana use: denies   -Mandaen or Spiritual beliefs: None   -Social Determinates of Health Concerns: None     FAMILY HISTORY  -Dad  at age 74 years old from lung cancer (smoker)   -Mom living 80 years old healthy   -Two sister healthy   -No other known history of hematologic, bleeding, clotting, autoimmune, genetic, or malignant disorders in the family.     OCCUPATIONAL/ENVIRONMENTAL HISTORY/EXPOSURES:  -None reported    Active Problems, Allergy List, Medication List, and PMH/PSH/FH/Social Hx have been reviewed and reconciled in chart. Updates made when necessary.     REVIEW OF SYSTEMS   A review of systems has been completed and are negative for complaints except what is stated in the assessment, HPI, IH, ROS, and/or past medical history.    ALLERGIES AND MEDICATIONS     Allergies and Intolerances:   No Known Allergies   Medication Profile:   No current outpatient medications   Available Vaccination Record:     There is no immunization history on file for this patient.   PHYSICAL EXAM     Vital Signs/Measurements:       2023    10:46 AM 2023    10:48 AM 2023     1:33 PM 8/15/2023    11:34 AM 3/11/2024     2:22 PM 2024    10:32 AM 10/23/2024    12:48 PM   Vitals   Systolic  130 120 122 110 118 112   Diastolic  80 70 72 72 70 78   Heart Rate  73 89 74 101 76 69   Temp  36.6 °C (97.8 °F) 36.2 °C (97.2 °F) 36.2 °C (97.1 °F)   36.1 °C (97 °F)   Resp   16  16  17   Height (in) 1.702 m (5' 7\")  1.702 m (5' 7\") 1.702 m (5' 7\") 1.702 m (5' 7\")  1.678 m (5' 6.06\")    Weight (lb) 178  178.6 180 177.8 176 175.82   BMI 27.88 kg/m2  27.97 kg/m2 28.19 kg/m2 27.85 kg/m2 27.57 kg/m2 28.32 kg/m2   BSA (m2) 1.95 m2  1.96 m2 1.96 m2 1.95 m2 1.94 m2 1.93 m2   Visit Report    Report Report Report Report       Significant value      Performance:   ECOG Performance Status: 0     Grade ECOG performance status   0 " Fully active, able to carry on all pre-disease performance without restriction   1 Restricted in physically strenuous activity but ambulatory and able to carry out work of a light or sedentary nature, e.g., light housework, office work   2 Ambulatory and capable of all selfcare but unable to carry out any work activities; Up and about more than 50% of waking hours   3 Capable of only limited selfcare, confined to bed or chair more than 50% of waking hours   4 Completely disabled; Cannot carry out any selfcare; Totally confined to bed or chair   5 Dead     Physical Exam:  General: Patient is awake/alert/oriented x3, no distress, Nourished, hydrated, alert and cooperative, ambulating without difficulty  Skin: Expected color for ethnicity, good turgor, dry, no prominent lesions, rashes, unusual bruising, or bleeding.  Cherry angiomas sporadic on skin bilateral arms.  Hair: Normal texture and distribution   Nails: Normal color, no deformities    HEENT:   Head: Normocephalic, atraumatic, no visible masses, depressions, or scarring   Eyes: Conjunctiva clear, sclera non-icteric, no exudates or hemorrhages   Ears: nl appearance, hearing intact    Nose: no external lesions, mucosa non-inflamed, no rhinorrhea   Mouth: Mucous membranes moist, no lesions, sores, bleeding, or erythema     Head/Neck: Neck supple, no apparent injury, thyroid without mass or tenderness, No JVD, trachea midline, no bruits appreciated    Respiratory/Thorax: Patent airways, CTAB, chest symmetry, normal inspiratory and expiratory effort    Cardiovascular: Regular rate and rhythm, no murmur or gallop, no carotid bruit or thrills    Gastrointestinal: Bowel sounds normal, non-distended, soft, no tenderness, no masses or hernia, or organomegaly appreciated    Genitourinary: deferred   Musculoskeletal: Normal gait, normal range of motion, no pain on palpation of spine, no deformity, normal strength for baseline, no atrophy, and no CVA tenderness appreciated    Extremities: No amputations or deformities, cyanosis, edema or viscosities, peripheral pulses intact   Neurological: Sensation present to touch, intact senses, motor response and reflexes normal, normal strength   Breast:    Lymphatic: No significant lymphadenopathy   Psychological: Intact recent and remote memory, judgement, and insight. Appropriate mood, affect, and behavior          RESULTS/DATA     Labs:     Lab Results   Component Value Date    WBC 3.7 (L) 10/23/2024    NEUTROABS 2.03 10/23/2024    IGABSOL 0.00 10/23/2024    LYMPHSABS 1.27 10/23/2024    MONOSABS 0.30 10/23/2024    EOSABS 0.03 10/23/2024    BASOSABS 0.02 10/23/2024    RBC 4.26 10/23/2024    MCV 98 10/23/2024    MCHC 31.7 (L) 10/23/2024    HGB 13.3 10/23/2024    HCT 41.9 10/23/2024     10/23/2024     Lab Results   Component Value Date    RETICCTPCT 1.6 10/23/2024      Lab Results   Component Value Date    CREATININE 0.71 10/23/2024    BUN 16 10/23/2024    EGFR >90 10/23/2024     10/23/2024    K 3.7 10/23/2024     10/23/2024    CO2 25 10/23/2024      Lab Results   Component Value Date    ALT 13 10/23/2024    AST 13 10/23/2024    ALKPHOS 58 10/23/2024    BILITOT 0.6 10/23/2024      Lab Results   Component Value Date    TSH 1.86 07/31/2024     Lab Results   Component Value Date    TSH 1.86 07/31/2024    K5ZQRXV 95 07/31/2024    THYROIDPAB 580 (A) 04/05/2021     Lab Results   Component Value Date    IRON 116 10/23/2024    TIBC 291 10/23/2024    FERRITIN 195 (H) 10/23/2024      Lab Results   Component Value Date    GRWDAZRS24 551 10/23/2024      Lab Results   Component Value Date    FOLATE 22.1 10/23/2024     Lab Results   Component Value Date    SEDRATE 9 10/23/2024      Lab Results   Component Value Date    CRP 0.19 10/23/2024     Lab Results   Component Value Date    SPEP Normal.    07/31/2024        Radiology/Studies:   CT chest abdomen and pelvis ordered and patient is due for her bilateral mammogram    ASSESSMENT/PLAN      Assessment and Plan:   #1.  Leukopenia  64-year-old female presents for evaluation of leukopenia.  Upon review of patient's medical records mild leukopenia dating back to 12/27/2019 and one episode of thrombocytopenia 2 months ago.  Discussed the possibilities of leukopenia.  Advised workup since over 3 to 4 years of notable low white count.  Patient in agreement to workup.  CT chest abdomen pelvis ordered due to multiple complaints of shortness of breath, abdominal pain and diarrhea.    8/22/2023 bilateral mammogram completed with no signs of malignancy.  2/6/2023 colonoscopy performed and reported as unremarkable.    ~Patient does not take any medications though most recently noted hypercholesteremia.  Advised patient to follow-up with primary care to start a statin or similar.  Discussed the importance of cardiovascular disease and health.    **Please follow with specialties as scheduled for other comorbidities and routine health screenings.**    I have reviewed the patient's medical record including provider notes, laboratory and testing results, imaging, and procedures available within the system and outside the system pertinent to patient care.     Follow up:    RTC:  -3 weeks to review labs and imaging    Medications:  -N/A    Imaging/Testing:  -CT chest abdomen pelvis    Referral:  -Dermatology    Other Pertinent Appointments:  -NA      Patient Discussion Summary:  Discussed plan of care in detail. Patient states understanding and in agreement. Answered all questions. They will call with any additional questions and/or concerns.    Thank you for allowing me to participate in your care. It was a pleasure meeting you.    Sincerely,  Estrellita Mccurdy, APRN-CNP       This document may have been written by voice recognition software.  There may be some incorrect wording, spelling and/or spelling errors or punctuation errors that were not corrected prior to committing the note to the medical record. Please  request clarification if there is documentation error or clarification is needed.   Time based billing: Please see documentation within this specific encounter.

## 2024-10-23 NOTE — LETTER
"October 24, 2024     Breezy Burns MD  [address]  [facility], [address]  [place] [address]    Patient: Marian Garcia   YOB: 1960   Date of Visit: 10/23/2024       Dear Dr. Breezy Burns MD:    Thank you for referring Marian Garcia to me for evaluation. Below are my notes for this consultation.  If you have questions, please do not hesitate to call me. I look forward to following your patient along with you.       Sincerely,     Estrellita Mccurdy, APRN-CNP      CC: No Recipients  ______________________________________________________________________________________    [facility]    PATIENT VISIT INFORMATION    Visit Type: New Visit    Referring Provider: Breezy Burns MD  Reason for referral: Leukopenia  Primary Practice Provider: Breezy Burns MD    HEMATOLOGY HISTORY    64-year-old female presents for evaluation of leukopenia.  Upon review of patient's medical records mild leukopenia dating back to 12/27/2019 and one episode of thrombocytopenia 2 months ago.  8/22/2023 bilateral mammogram completed with no signs of malignancy.  2/6/2023 colonoscopy performed and reported as unremarkable.  Patient does not take any medications though most recently noted hypercholesteremia.  HISTORY OF PRESENT ILLNESS     ID Statement: Marian Garcia is a 64-year-old female    Chief Complaint: \"Really tiered and short of breath\"     Interval History:   Patient presents for evaluation of leukopenia accompanied by adult son.  She states she is really tired all the time.  Notes nocturia which prevents her from getting a good night sleep. SOB while I walk. \"Cannot catch my breathe.\" No CP or palpitations. No leg swelling. No rashes or sores. Easily bruise and bleed. \"Increase in red spots on body though I have had a my whole life.\" Abdominal pain, causes use of bathroom, loose stool. Colonoscopy. Cannot eat spicy. No n/v/c, no blood or melena. No hematuria. Knee pain from " arthritis. Sometimes back pain with housework.   Low energy, fatigue. Always feel tiered. Gained weight. Use to walk a lot. Low appetite, eats two meals per day. No Lymphadenopathy or neuropathy. Hot intolerance. Kendra fever, drenching night sweats, chills, illness or infection. No neurological symptoms other than some dizziness.   PAST/CURRENT HISTORY     MEDICAL/SURGICAL HISTORY  -Hypercholesteremia  -Hypothyroidism though not on medication over the last year  -Menopausal symptoms  -Leukopenia  -Abnormal EKG-  -shortness of breath    SOCIAL HISTORY  -Lives with  (two healthy children)   -Work place: Housewife   -Tobacco/smokeless use: Denies    -Alcohol: Denies   -Illicit drug or marijuana use: denies   -Yarsanism or Spiritual beliefs: None   -Social Determinates of Health Concerns: None     FAMILY HISTORY  -Dad  at age 74 years old from lung cancer (smoker)   -Mom living 80 years old healthy   -Two sister healthy   -No other known history of hematologic, bleeding, clotting, autoimmune, genetic, or malignant disorders in the family.     OCCUPATIONAL/ENVIRONMENTAL HISTORY/EXPOSURES:  -None reported    Active Problems, Allergy List, Medication List, and PMH/PSH/FH/Social Hx have been reviewed and reconciled in chart. Updates made when necessary.     REVIEW OF SYSTEMS   A review of systems has been completed and are negative for complaints except what is stated in the assessment, HPI, IH, ROS, and/or past medical history.    ALLERGIES AND MEDICATIONS     Allergies and Intolerances:   No Known Allergies   Medication Profile:   No current outpatient medications   Available Vaccination Record:     There is no immunization history on file for this patient.   PHYSICAL EXAM     Vital Signs/Measurements:       2023    10:46 AM 2023    10:48 AM 2023     1:33 PM 8/15/2023    11:34 AM 3/11/2024     2:22 PM 2024    10:32 AM 10/23/2024    12:48 PM   Vitals   Systolic  130 120 122 110 118 112  "  Diastolic  80 70 72 72 70 78   Heart Rate  73 89 74 101 76 69   Temp  36.6 °C (97.8 °F) 36.2 °C (97.2 °F) 36.2 °C (97.1 °F)   36.1 °C (97 °F)   Resp   16  16  17   Height (in) 1.702 m (5' 7\")  1.702 m (5' 7\") 1.702 m (5' 7\") 1.702 m (5' 7\")  1.678 m (5' 6.06\")    Weight (lb) 178  178.6 180 177.8 176 175.82   BMI 27.88 kg/m2  27.97 kg/m2 28.19 kg/m2 27.85 kg/m2 27.57 kg/m2 28.32 kg/m2   BSA (m2) 1.95 m2  1.96 m2 1.96 m2 1.95 m2 1.94 m2 1.93 m2   Visit Report    Report Report Report Report       Significant value      Performance:   ECOG Performance Status: 0     Grade ECOG performance status   0 Fully active, able to carry on all pre-disease performance without restriction   1 Restricted in physically strenuous activity but ambulatory and able to carry out work of a light or sedentary nature, e.g., light housework, office work   2 Ambulatory and capable of all selfcare but unable to carry out any work activities; Up and about more than 50% of waking hours   3 Capable of only limited selfcare, confined to bed or chair more than 50% of waking hours   4 Completely disabled; Cannot carry out any selfcare; Totally confined to bed or chair   5 Dead     Physical Exam:  General: Patient is awake/alert/oriented x3, no distress, Nourished, hydrated, alert and cooperative, ambulating without difficulty  Skin: Expected color for ethnicity, good turgor, dry, no prominent lesions, rashes, unusual bruising, or bleeding.  Cherry angiomas sporadic on skin bilateral arms.  Hair: Normal texture and distribution   Nails: Normal color, no deformities    HEENT:   Head: Normocephalic, atraumatic, no visible masses, depressions, or scarring   Eyes: Conjunctiva clear, sclera non-icteric, no exudates or hemorrhages   Ears: nl appearance, hearing intact    Nose: no external lesions, mucosa non-inflamed, no rhinorrhea   Mouth: Mucous membranes moist, no lesions, sores, bleeding, or erythema     Head/Neck: Neck supple, no apparent injury, " thyroid without mass or tenderness, No JVD, trachea midline, no bruits appreciated    Respiratory/Thorax: Patent airways, CTAB, chest symmetry, normal inspiratory and expiratory effort    Cardiovascular: Regular rate and rhythm, no murmur or gallop, no carotid bruit or thrills    Gastrointestinal: Bowel sounds normal, non-distended, soft, no tenderness, no masses or hernia, or organomegaly appreciated    Genitourinary: deferred   Musculoskeletal: Normal gait, normal range of motion, no pain on palpation of spine, no deformity, normal strength for baseline, no atrophy, and no CVA tenderness appreciated   Extremities: No amputations or deformities, cyanosis, edema or viscosities, peripheral pulses intact   Neurological: Sensation present to touch, intact senses, motor response and reflexes normal, normal strength   Breast:    Lymphatic: No significant lymphadenopathy   Psychological: Intact recent and remote memory, judgement, and insight. Appropriate mood, affect, and behavior          RESULTS/DATA     Labs:     Lab Results   Component Value Date    WBC 3.7 (L) 10/23/2024    NEUTROABS 2.03 10/23/2024    IGABSOL 0.00 10/23/2024    LYMPHSABS 1.27 10/23/2024    MONOSABS 0.30 10/23/2024    EOSABS 0.03 10/23/2024    BASOSABS 0.02 10/23/2024    RBC 4.26 10/23/2024    MCV 98 10/23/2024    MCHC 31.7 (L) 10/23/2024    HGB 13.3 10/23/2024    HCT 41.9 10/23/2024     10/23/2024     Lab Results   Component Value Date    RETICCTPCT 1.6 10/23/2024      Lab Results   Component Value Date    CREATININE 0.71 10/23/2024    BUN 16 10/23/2024    EGFR >90 10/23/2024     10/23/2024    K 3.7 10/23/2024     10/23/2024    CO2 25 10/23/2024      Lab Results   Component Value Date    ALT 13 10/23/2024    AST 13 10/23/2024    ALKPHOS 58 10/23/2024    BILITOT 0.6 10/23/2024      Lab Results   Component Value Date    TSH 1.86 07/31/2024     Lab Results   Component Value Date    TSH 1.86 07/31/2024    J8OBYCT 95 07/31/2024     THYROIDPAB 580 (A) 04/05/2021     Lab Results   Component Value Date    IRON 116 10/23/2024    TIBC 291 10/23/2024    FERRITIN 195 (H) 10/23/2024      Lab Results   Component Value Date    [id number] 551 10/23/2024      Lab Results   Component Value Date    FOLATE 22.1 10/23/2024     Lab Results   Component Value Date    SEDRATE 9 10/23/2024      Lab Results   Component Value Date    CRP 0.19 10/23/2024     Lab Results   Component Value Date    SPEP Normal.    07/31/2024        Radiology/Studies:   CT chest abdomen and pelvis ordered and patient is due for her bilateral mammogram    ASSESSMENT/PLAN     Assessment and Plan:   #1.  Leukopenia  64-year-old female presents for evaluation of leukopenia.  Upon review of patient's medical records mild leukopenia dating back to 12/27/2019 and one episode of thrombocytopenia 2 months ago.  Discussed the possibilities of leukopenia.  Advised workup since over 3 to 4 years of notable low white count.  Patient in agreement to workup.  CT chest abdomen pelvis ordered due to multiple complaints of shortness of breath, abdominal pain and diarrhea.    8/22/2023 bilateral mammogram completed with no signs of malignancy.  2/6/2023 colonoscopy performed and reported as unremarkable.    ~Patient does not take any medications though most recently noted hypercholesteremia.  Advised patient to follow-up with primary care to start a statin or similar.  Discussed the importance of cardiovascular disease and health.    **Please follow with specialties as scheduled for other comorbidities and routine health screenings.**    I have reviewed the patient's medical record including provider notes, laboratory and testing results, imaging, and procedures available within the system and outside the system pertinent to patient care.     Follow up:    RTC:  -3 weeks to review labs and imaging    Medications:  -N/A    Imaging/Testing:  -CT chest abdomen pelvis    Referral:  -Dermatology    Other  Pertinent Appointments:  -NA      Patient Discussion Summary:  Discussed plan of care in detail. Patient states understanding and in agreement. Answered all questions. They will call with any additional questions and/or concerns.    Thank you for allowing me to participate in your care. It was a pleasure meeting you.    Sincerely,  Estrellita Mccurdy, APRN-CNP       This document may have been written by voice recognition software.  There may be some incorrect wording, spelling and/or spelling errors or punctuation errors that were not corrected prior to committing the note to the medical record. Please request clarification if there is documentation error or clarification is needed.   Time based billing: Please see documentation within this specific encounter.

## 2024-10-24 ENCOUNTER — TELEPHONE (OUTPATIENT)
Dept: HEMATOLOGY/ONCOLOGY | Facility: CLINIC | Age: 64
End: 2024-10-24
Payer: COMMERCIAL

## 2024-10-24 DIAGNOSIS — D72.819 LEUKOPENIA, UNSPECIFIED TYPE: Primary | ICD-10-CM

## 2024-10-24 LAB
CENTROMERE B AB SER-ACNC: <0.2 AI
CHROMATIN AB SERPL-ACNC: <0.2 AI
CMV IGG AVIDITY SERPL IA-RTO: REACTIVE %
DSDNA AB SER-ACNC: <1 IU/ML
EBV EA IGG SER QL: POSITIVE
EBV NA AB SER QL: NEGATIVE
EBV VCA IGG SER IA-ACNC: POSITIVE
EBV VCA IGM SER IA-ACNC: NEGATIVE
ENA JO1 AB SER QL IA: <0.2 AI
ENA RNP AB SER IA-ACNC: <0.2 AI
ENA SCL70 AB SER QL IA: 0.2 AI
ENA SM AB SER IA-ACNC: <0.2 AI
ENA SM+RNP AB SER QL IA: <0.2 AI
ENA SS-A AB SER IA-ACNC: <0.2 AI
ENA SS-B AB SER IA-ACNC: <0.2 AI
FOLATE SERPL-MCNC: 22.1 NG/ML
HBV CORE AB SER QL: REACTIVE
HBV CORE IGM SER QL: NONREACTIVE
HCV AB SER QL: NONREACTIVE
HCYS SERPL-SCNC: 11.66 UMOL/L (ref 5–13.9)
HIV 1+2 AB+HIV1 P24 AG SERPL QL IA: NONREACTIVE
RIBOSOMAL P AB SER-ACNC: <0.2 AI
VIT B12 SERPL-MCNC: 551 PG/ML (ref 211–911)

## 2024-10-25 LAB
ANA PATTERN: ABNORMAL
ANA SER QL HEP2 SUBST: POSITIVE
ANA TITR SER IF: ABNORMAL {TITER}
CMV IGM SERPL-ACNC: <8 AU/ML
COPPER SERPL-MCNC: 112.2 UG/DL (ref 80–155)
HBV SURFACE AG SERPL QL IA: NONREACTIVE

## 2024-10-28 LAB
CELL COUNT (BLOOD): 3.7 X10*3/UL
CELL POPULATIONS: NORMAL
CYTOGENETICS/MOLECULAR TEST ORDERED: NO
DIAGNOSIS: NORMAL
FLOW DIFFERENTIAL: NORMAL
FLOW TEST ORDERED: NORMAL
LAB TEST METHOD: NORMAL
NUMBER OF CELLS COLLECTED: NORMAL PER TUBE
PATH REPORT.TOTAL CANCER: NORMAL
RBC MORPH BLD: NORMAL
SIGNATURE COMMENT: NORMAL
SPECIMEN VIABILITY: NORMAL
WBC MORPH BLD: NORMAL

## 2024-10-29 LAB
BASOPHILS # BLD AUTO: 0.02 X10*3/UL (ref 0–0.1)
BASOPHILS NFR BLD AUTO: 0.5 %
CHROM ANALY OVERALL INTERP-IMP: NORMAL
ELECTRONICALLY SIGNED BY CYTOGENETICS: NORMAL
ELECTRONICALLY SIGNED BY: NORMAL
EOSINOPHIL # BLD AUTO: 0.03 X10*3/UL (ref 0–0.7)
EOSINOPHIL NFR BLD AUTO: 0.8 %
ERYTHROCYTE [DISTWIDTH] IN BLOOD BY AUTOMATED COUNT: 12.3 % (ref 11.5–14.5)
HCT VFR BLD AUTO: 41.9 % (ref 36–46)
HGB BLD-MCNC: 13.3 G/DL (ref 12–16)
IMM GRANULOCYTES # BLD AUTO: 0 X10*3/UL (ref 0–0.7)
IMM GRANULOCYTES NFR BLD AUTO: 0 % (ref 0–0.9)
LYMPHOCYTES # BLD AUTO: 1.27 X10*3/UL (ref 1.2–4.8)
LYMPHOCYTES NFR BLD AUTO: 34.8 %
MCH RBC QN AUTO: 31.2 PG (ref 26–34)
MCHC RBC AUTO-ENTMCNC: 31.7 G/DL (ref 32–36)
MCV RBC AUTO: 98 FL (ref 80–100)
MONOCYTES # BLD AUTO: 0.3 X10*3/UL (ref 0.1–1)
MONOCYTES NFR BLD AUTO: 8.2 %
MYELOID NGS RESULTS: NORMAL
NEUTROPHILS # BLD AUTO: 2.03 X10*3/UL (ref 1.2–7.7)
NEUTROPHILS NFR BLD AUTO: 55.7 %
NRBC BLD-RTO: ABNORMAL /100{WBCS}
PLATELET # BLD AUTO: 215 X10*3/UL (ref 150–450)
RBC # BLD AUTO: 4.26 X10*6/UL (ref 4–5.2)
WBC # BLD AUTO: 3.7 X10*3/UL (ref 4.4–11.3)

## 2024-10-30 LAB
CHROM ANALY OVERALL INTERP-IMP: NORMAL
ELECTRONICALLY COSIGNED BY CYTOGENETICS: NORMAL
ELECTRONICALLY SIGNED BY CYTOGENETICS: NORMAL
FISH ISCN RESULTS: NORMAL

## 2024-10-31 ENCOUNTER — TELEPHONE (OUTPATIENT)
Dept: PRIMARY CARE | Facility: CLINIC | Age: 64
End: 2024-10-31
Payer: COMMERCIAL

## 2024-10-31 DIAGNOSIS — E78.5 HYPERLIPIDEMIA, UNSPECIFIED HYPERLIPIDEMIA TYPE: Primary | ICD-10-CM

## 2024-10-31 RX ORDER — ROSUVASTATIN CALCIUM 5 MG/1
5 TABLET, COATED ORAL DAILY
Qty: 100 TABLET | Refills: 3 | Status: SHIPPED | OUTPATIENT
Start: 2024-10-31 | End: 2025-12-05

## 2024-11-04 ENCOUNTER — APPOINTMENT (OUTPATIENT)
Dept: RADIOLOGY | Facility: CLINIC | Age: 64
End: 2024-11-04
Payer: COMMERCIAL

## 2024-11-04 ENCOUNTER — TELEPHONE (OUTPATIENT)
Dept: HEMATOLOGY/ONCOLOGY | Facility: HOSPITAL | Age: 64
End: 2024-11-04
Payer: COMMERCIAL

## 2024-11-05 ENCOUNTER — TELEPHONE (OUTPATIENT)
Dept: HEMATOLOGY/ONCOLOGY | Facility: CLINIC | Age: 64
End: 2024-11-05
Payer: COMMERCIAL

## 2024-11-05 NOTE — TELEPHONE ENCOUNTER
Called and spoke to Patito at [facility], she stated that because the scan has been denied we are not able to complete a peer to peer at this time. The only thing left that we can do is an external review with another provider, so they have mailed a release form to the pt and once she signs and they receive it they can send over all of the information to the external review and that review could take 7-10 days. We were able to send in an additional diagnosis, the updated order was faxed to [phone]. RANJITH Mccurdy updated on plan and they will be in contact with our office once a determination has been made.

## 2024-11-05 NOTE — TELEPHONE ENCOUNTER
Called and updated pt regarding plan to get CT Chest approved, let her know that it could take up to 10 days for a determination once they receive the signed medical release form so she may have to reschedule her scan again until we hear back from the insurance. Patient agreed to plan and verbalized understanding using teach back method.

## 2024-11-05 NOTE — TELEPHONE ENCOUNTER
Was unable to reach [facility], left VM with our office contact information and asked that a representative give our office back to further discuss. Updated RANJITH Mccurdy that I am waiting for a call back.

## 2024-11-09 DIAGNOSIS — D72.819 LEUKOPENIA, UNSPECIFIED TYPE: ICD-10-CM

## 2024-11-09 DIAGNOSIS — R76.8 HEPATITIS B ANTIBODY POSITIVE: Primary | ICD-10-CM

## 2024-11-11 ENCOUNTER — APPOINTMENT (OUTPATIENT)
Dept: HEMATOLOGY/ONCOLOGY | Facility: HOSPITAL | Age: 64
End: 2024-11-11
Payer: COMMERCIAL

## 2024-11-15 NOTE — TELEPHONE ENCOUNTER
Nia called to speak with insurance, they are waiting to receive the medical release from the patient to pursue to third party review and will call our office back once it is completed with a determination.

## 2024-11-18 ENCOUNTER — APPOINTMENT (OUTPATIENT)
Dept: HEMATOLOGY/ONCOLOGY | Facility: HOSPITAL | Age: 64
End: 2024-11-18
Payer: COMMERCIAL

## 2024-11-25 DIAGNOSIS — D69.6 THROMBOCYTOPENIA (CMS-HCC): Primary | ICD-10-CM

## 2024-11-25 DIAGNOSIS — R19.7 DIARRHEA, UNSPECIFIED TYPE: ICD-10-CM

## 2024-11-25 DIAGNOSIS — R06.02 SOB (SHORTNESS OF BREATH): ICD-10-CM

## 2024-11-25 DIAGNOSIS — D72.819 LEUKOPENIA, UNSPECIFIED TYPE: ICD-10-CM

## 2024-11-25 DIAGNOSIS — R10.9 ABDOMINAL PAIN, UNSPECIFIED ABDOMINAL LOCATION: ICD-10-CM

## 2024-11-26 ENCOUNTER — HOSPITAL ENCOUNTER (OUTPATIENT)
Dept: RADIOLOGY | Facility: CLINIC | Age: 64
End: 2024-11-26
Payer: COMMERCIAL

## 2024-11-26 NOTE — TELEPHONE ENCOUNTER
Called and left VM for patient, per RANJITH Mccurdy she will see pt as scheduled and will discuss plan for follow up if CT is denied. Encouraged pt to call our office should she have any further questions.

## 2024-12-06 ENCOUNTER — TELEMEDICINE (OUTPATIENT)
Dept: HEMATOLOGY/ONCOLOGY | Facility: HOSPITAL | Age: 64
End: 2024-12-06
Payer: COMMERCIAL

## 2024-12-06 ENCOUNTER — APPOINTMENT (OUTPATIENT)
Dept: HEMATOLOGY/ONCOLOGY | Facility: HOSPITAL | Age: 64
End: 2024-12-06
Payer: COMMERCIAL

## 2024-12-06 DIAGNOSIS — R19.7 DIARRHEA, UNSPECIFIED TYPE: ICD-10-CM

## 2024-12-06 DIAGNOSIS — T14.8XXA BRUISE: ICD-10-CM

## 2024-12-06 DIAGNOSIS — D72.819 LEUKOPENIA, UNSPECIFIED TYPE: ICD-10-CM

## 2024-12-06 DIAGNOSIS — R10.9 ABDOMINAL PAIN, UNSPECIFIED ABDOMINAL LOCATION: ICD-10-CM

## 2024-12-06 DIAGNOSIS — D69.6 THROMBOCYTOPENIA (CMS-HCC): ICD-10-CM

## 2024-12-06 PROCEDURE — 99214 OFFICE O/P EST MOD 30 MIN: CPT

## 2024-12-06 NOTE — PROGRESS NOTES
"[facility]    PATIENT VISIT INFORMATION    Visit Type: Follow up Visit  I performed this visit using real-time telehealth tools, including an audio/video connection between (Marian Garcia at her home) and (Estrellita Mccurdy, CNP [facility])  Patient consents to telemedicine service today and understands the limitations of the visit, no physical examination and all issues may not be able to be addressed today, other than brief neuro and psych assessment.   Referring Provider: Breezy Burns MD  Reason for referral: Leukopenia  Primary Practice Provider: Breezy Burns MD    HEMATOLOGY HISTORY    64-year-old female presents for evaluation of leukopenia.  Upon review of patient's medical records mild leukopenia dating back to 12/27/2019 and one episode of thrombocytopenia 2 months ago.  8/22/2023 bilateral mammogram completed with no signs of malignancy.  2/6/2023 colonoscopy performed and reported as unremarkable.  Patient does not take any medications though most recently noted hypercholesteremia.  HISTORY OF PRESENT ILLNESS     ID Statement: Marian Garcia is a 64-year-old female    Chief Complaint: Follow up results    Interval History:   Patient presents for evaluation follow up of leukopenia. She for got to tell mw that she had gallstones over 40 years ago.  She states she is really tired all the time.  Notes nocturia which prevents her from getting a good night sleep. SOB while I walk. \"Cannot catch my breathe.\" No CP or palpitations. No leg swelling. No rashes or sores. Easily bruise and bleed. \"Increase in red spots on body though I have had a my whole life.\" Abdominal pain, causes use of bathroom, loose stool off and on. Colonoscopy in 2023. Cannot eat spicy. No n/v/c, no blood or melena. No hematuria. Knee pain from arthritis. Sometimes back pain with housework.   Low energy, fatigue. Always feel tiered. Gained weight. Use to walk a lot. Low appetite, eats two meals per day. No " Lymphadenopathy or neuropathy. Hot intolerance. Kendra fever, drenching night sweats, chills, illness or infection. No neurological symptoms other than some dizziness.   PAST/CURRENT HISTORY     MEDICAL/SURGICAL HISTORY  -Hypercholesteremia  -Hypothyroidism though not on medication over the last year  -Menopausal symptoms  -Leukopenia  -Abnormal EKG-  -shortness of breath    SOCIAL HISTORY  -Lives with  (two healthy children)   -Work place: Housewife   -Tobacco/smokeless use: Denies    -Alcohol: Denies   -Illicit drug or marijuana use: denies   -Hoahaoism or Spiritual beliefs: None   -Social Determinates of Health Concerns: None     FAMILY HISTORY  -Dad  at age 74 years old from lung cancer (smoker)   -Mom living 80 years old healthy   -Two sister healthy   -No other known history of hematologic, bleeding, clotting, autoimmune, genetic, or malignant disorders in the family.     OCCUPATIONAL/ENVIRONMENTAL HISTORY/EXPOSURES:  -None reported    Active Problems, Allergy List, Medication List, and PMH/PSH/FH/Social Hx have been reviewed and reconciled in chart. Updates made when necessary.     REVIEW OF SYSTEMS   A review of systems has been completed and are negative for complaints except what is stated in the assessment, HPI, IH, ROS, and/or past medical history.    ALLERGIES AND MEDICATIONS     Allergies and Intolerances:   No Known Allergies   Medication Profile:   Current Outpatient Medications   Medication Instructions    rosuvastatin (CRESTOR) 5 mg, oral, Daily      Available Vaccination Record:     There is no immunization history on file for this patient.   PHYSICAL EXAM     Vital Signs/Measurements:       2023    10:46 AM 2023    10:48 AM 2023     1:33 PM 8/15/2023    11:34 AM 3/11/2024     2:22 PM 2024    10:32 AM 10/23/2024    12:48 PM   Vitals   Systolic  130 120 122 110 118 112   Diastolic  80 70 72 72 70 78   BP Location       Right arm   Heart Rate  73 89 74 101 76 69  "  Temp  36.6 °C (97.8 °F) 36.2 °C (97.2 °F) 36.2 °C (97.1 °F)   36.1 °C (97 °F)   Resp   16  16  17   Height 1.702 m (5' 7\")  1.702 m (5' 7\") 1.702 m (5' 7\") 1.702 m (5' 7\")  1.678 m (5' 6.06\")    Weight (lb) 178  178.6 180 177.8 176 175.82   BMI 27.88 kg/m2  27.97 kg/m2 28.19 kg/m2 27.85 kg/m2 27.57 kg/m2 28.32 kg/m2   BSA (m2) 1.95 m2  1.96 m2 1.96 m2 1.95 m2 1.94 m2 1.93 m2   Visit Report    Report Report Report Report       Significant value      Performance:   ECOG Performance Status: 0     Grade ECOG performance status   0 Fully active, able to carry on all pre-disease performance without restriction   1 Restricted in physically strenuous activity but ambulatory and able to carry out work of a light or sedentary nature, e.g., light housework, office work   2 Ambulatory and capable of all selfcare but unable to carry out any work activities; Up and about more than 50% of waking hours   3 Capable of only limited selfcare, confined to bed or chair more than 50% of waking hours   4 Completely disabled; Cannot carry out any selfcare; Totally confined to bed or chair   5 Dead     Physical Exam:  General: Patient is awake/alert/oriented x3, no distress, Nourished, hydrated, alert and cooperative                                       Psychological: Intact recent and remote memory, judgement, and insight. Appropriate mood, affect, and behavior          RESULTS/DATA     Labs:     Lab Results   Component Value Date    WBC 3.7 (L) 10/23/2024    NEUTROABS 2.03 10/23/2024    IGABSOL 0.00 10/23/2024    LYMPHSABS 1.27 10/23/2024    MONOSABS 0.30 10/23/2024    EOSABS 0.03 10/23/2024    BASOSABS 0.02 10/23/2024    RBC 4.26 10/23/2024    MCV 98 10/23/2024    MCHC 31.7 (L) 10/23/2024    HGB 13.3 10/23/2024    HCT 41.9 10/23/2024     10/23/2024     Lab Results   Component Value Date    RETICCTPCT 1.6 10/23/2024      Lab Results   Component Value Date    CREATININE 0.71 10/23/2024    BUN 16 10/23/2024    EGFR >90 10/23/2024 "     10/23/2024    K 3.7 10/23/2024     10/23/2024    CO2 25 10/23/2024      Lab Results   Component Value Date    ALT 13 10/23/2024    AST 13 10/23/2024    ALKPHOS 58 10/23/2024    BILITOT 0.6 10/23/2024      Lab Results   Component Value Date    TSH 1.86 07/31/2024     Lab Results   Component Value Date    TSH 1.86 07/31/2024    C4VDGRA 95 07/31/2024    THYROIDPAB 580 (A) 04/05/2021     Lab Results   Component Value Date    IRON 116 10/23/2024    TIBC 291 10/23/2024    FERRITIN 195 (H) 10/23/2024      Lab Results   Component Value Date    [id number] 551 10/23/2024      Lab Results   Component Value Date    FOLATE 22.1 10/23/2024     Lab Results   Component Value Date    EJ Positive (A) 10/23/2024    SEDRATE 9 10/23/2024      Lab Results   Component Value Date    CRP 0.19 10/23/2024     Lab Results   Component Value Date    SPEP Normal.    07/31/2024        Radiology/Studies:   CT chest abdomen and pelvis ordered and patient is due for her bilateral mammogram    ASSESSMENT/PLAN     Assessment and Plan:   #1.  Leukopenia  64-year-old female presents for evaluation of leukopenia.  Upon review of patient's medical records mild leukopenia dating back to 12/27/2019 and one episode of thrombocytopenia 2 months ago.  Discussed the possibilities of leukopenia.  Advised workup since over 3 to 4 years of notable low white count.  Patient in agreement to workup.  CT chest abdomen pelvis ordered due to multiple complaints of shortness of breath, abdominal pain and diarrhea.    8/22/2023 bilateral mammogram completed with no signs of malignancy.  2/6/2023 colonoscopy performed and reported as unremarkable.    ~Patient does not take any medications though most recently noted hypercholesteremia.  Advised patient to follow-up with primary care to start a statin or similar.  Discussed the importance of cardiovascular disease and health.    Discussion of results 12/6/2024: Patient will have hepatitis C surface antigen  drawn.  Hepatitis B surface antibody is reactive.  She will follow with hepatology.  Leukopenia is mild at 3.7 and platelets are normal.  NGS is negative for mutation.  Flow cytometry does not show any abnormality and cells.  No other concerns at this time she will follow with hepatology, imaging and repeat CBC after she sees appropriate specialties.  CT scan has been repeatedly denied by insurance they will give a final answer on 12/8/2024.  If denial patient will initiate with a chest x-ray and ultrasound of abdomen.  We will go from there.  Patient has started on statin.    **Please follow with specialties as scheduled for other comorbidities and routine health screenings.**    I have reviewed the patient's medical record including provider notes, laboratory and testing results, imaging, and procedures available within the system and outside the system pertinent to patient care.     Follow up:    RTC:  - 3-month follow-up with labs  -Will call with results of imaging once insurance approves    Medications:  -N/A    Imaging/Testing:  -CT chest abdomen pelvis if denied chest x-ray and ultrasound    Referral:  -Dermatology  -GI /hepatology    Other Pertinent Appointments:  -Primary care 1/14/2025  -GI hepatology 1/14/2025      Patient Discussion Summary:  Discussed plan of care in detail. Patient states understanding and in agreement. Answered all questions. They will call with any additional questions and/or concerns.    Thank you for allowing me to participate in your care. It was a pleasure meeting you.    Sincerely,  Estrellita Mccurdy, APRN-CNP       This document may have been written by voice recognition software.  There may be some incorrect wording, spelling and/or spelling errors or punctuation errors that were not corrected prior to committing the note to the medical record. Please request clarification if there is documentation error or clarification is needed.   Time based billing: Please see documentation  within this specific encounter.

## 2024-12-12 ENCOUNTER — TELEPHONE (OUTPATIENT)
Dept: HEMATOLOGY/ONCOLOGY | Facility: CLINIC | Age: 64
End: 2024-12-12
Payer: COMMERCIAL

## 2024-12-12 NOTE — TELEPHONE ENCOUNTER
Called and spoke to patient, let her know that CT scan was approved and pt is scheduled for scan already. No further intervention needed at this time.

## 2024-12-17 ENCOUNTER — APPOINTMENT (OUTPATIENT)
Dept: HEMATOLOGY/ONCOLOGY | Facility: HOSPITAL | Age: 64
End: 2024-12-17
Payer: COMMERCIAL

## 2025-01-07 ENCOUNTER — APPOINTMENT (OUTPATIENT)
Dept: RADIOLOGY | Facility: CLINIC | Age: 65
End: 2025-01-07
Payer: COMMERCIAL

## 2025-01-08 ENCOUNTER — HOSPITAL ENCOUNTER (OUTPATIENT)
Dept: RADIOLOGY | Facility: CLINIC | Age: 65
Discharge: HOME | End: 2025-01-08
Payer: COMMERCIAL

## 2025-01-08 ENCOUNTER — APPOINTMENT (OUTPATIENT)
Dept: RADIOLOGY | Facility: CLINIC | Age: 65
End: 2025-01-08
Payer: COMMERCIAL

## 2025-01-08 VITALS — BODY MASS INDEX: 28.12 KG/M2 | WEIGHT: 175 LBS | HEIGHT: 66 IN

## 2025-01-08 DIAGNOSIS — T14.8XXA BRUISE: ICD-10-CM

## 2025-01-08 DIAGNOSIS — D72.819 LEUKOPENIA, UNSPECIFIED TYPE: ICD-10-CM

## 2025-01-08 DIAGNOSIS — Z12.31 SCREENING MAMMOGRAM FOR BREAST CANCER: ICD-10-CM

## 2025-01-08 DIAGNOSIS — R19.7 DIARRHEA, UNSPECIFIED TYPE: ICD-10-CM

## 2025-01-08 DIAGNOSIS — D69.6 THROMBOCYTOPENIA (CMS-HCC): ICD-10-CM

## 2025-01-08 DIAGNOSIS — R06.02 SOB (SHORTNESS OF BREATH): ICD-10-CM

## 2025-01-08 DIAGNOSIS — R10.9 ABDOMINAL PAIN, UNSPECIFIED ABDOMINAL LOCATION: ICD-10-CM

## 2025-01-08 LAB
CREAT SERPL-MCNC: 0.7 MG/DL (ref 0.6–1.3)
GFR SERPL CREATININE-BSD FRML MDRD: >90 ML/MIN/1.73M*2

## 2025-01-08 PROCEDURE — 74177 CT ABD & PELVIS W/CONTRAST: CPT

## 2025-01-08 PROCEDURE — 74177 CT ABD & PELVIS W/CONTRAST: CPT | Performed by: RADIOLOGY

## 2025-01-08 PROCEDURE — 71260 CT THORAX DX C+: CPT | Performed by: RADIOLOGY

## 2025-01-08 PROCEDURE — 77063 BREAST TOMOSYNTHESIS BI: CPT | Performed by: RADIOLOGY

## 2025-01-08 PROCEDURE — 77067 SCR MAMMO BI INCL CAD: CPT | Performed by: RADIOLOGY

## 2025-01-08 PROCEDURE — 77067 SCR MAMMO BI INCL CAD: CPT

## 2025-01-08 PROCEDURE — 2550000001 HC RX 255 CONTRASTS

## 2025-01-08 PROCEDURE — 82565 ASSAY OF CREATININE: CPT

## 2025-01-08 RX ADMIN — IOHEXOL 75 ML: 350 INJECTION, SOLUTION INTRAVENOUS at 14:19

## 2025-01-10 DIAGNOSIS — R93.5 ABNORMAL CT OF THE ABDOMEN: Primary | ICD-10-CM

## 2025-01-10 DIAGNOSIS — Q51.9 UTERINE ANOMALY: ICD-10-CM

## 2025-01-10 DIAGNOSIS — R79.89 OTHER SPECIFIED ABNORMAL FINDINGS OF BLOOD CHEMISTRY: ICD-10-CM

## 2025-01-10 NOTE — PROGRESS NOTES
Patient with fall. Family states he has more falls when he has UTI. Afebrile. WBC 11.2. UA with 3+ leukocytes, >100 WBC. Patient with recent history of Pseudomonal UTI in December. Was inadequately treated with Cefpodoxime. HIgh suspicion that patient remains with pseudomonal infection despite UCx this admission only growing Candida so far.     Plan:  - Cefepime 1g IV q12h (start date 01/07) completed course of antibiotic and will not be sending home with abx.  - Will plan on continuing 5-7d course, likely can transition to FQ on disharge  - spoke to daughter on 1/10, she visited the patient yesterday and state that he is at baseline mentation status. He recognized family members and asked her about them. Planing for discharge tomorrow to nursing facility.      Spoke with patient she will have US Pelvis and call her GYN.   Discuss CT results.

## 2025-01-14 ENCOUNTER — OFFICE VISIT (OUTPATIENT)
Dept: GASTROENTEROLOGY | Facility: CLINIC | Age: 65
End: 2025-01-14
Payer: COMMERCIAL

## 2025-01-14 ENCOUNTER — APPOINTMENT (OUTPATIENT)
Dept: PRIMARY CARE | Facility: CLINIC | Age: 65
End: 2025-01-14
Payer: COMMERCIAL

## 2025-01-14 ENCOUNTER — LAB (OUTPATIENT)
Dept: LAB | Facility: LAB | Age: 65
End: 2025-01-14
Payer: COMMERCIAL

## 2025-01-14 VITALS
OXYGEN SATURATION: 96 % | SYSTOLIC BLOOD PRESSURE: 120 MMHG | DIASTOLIC BLOOD PRESSURE: 70 MMHG | BODY MASS INDEX: 27.97 KG/M2 | WEIGHT: 174 LBS | HEIGHT: 66 IN | HEART RATE: 70 BPM

## 2025-01-14 VITALS
DIASTOLIC BLOOD PRESSURE: 70 MMHG | WEIGHT: 175 LBS | HEART RATE: 70 BPM | HEIGHT: 67 IN | SYSTOLIC BLOOD PRESSURE: 120 MMHG | OXYGEN SATURATION: 96 % | BODY MASS INDEX: 27.47 KG/M2

## 2025-01-14 DIAGNOSIS — D72.819 LEUKOPENIA, UNSPECIFIED TYPE: ICD-10-CM

## 2025-01-14 DIAGNOSIS — E78.5 HYPERLIPIDEMIA, UNSPECIFIED HYPERLIPIDEMIA TYPE: ICD-10-CM

## 2025-01-14 DIAGNOSIS — D72.819 LEUKOPENIA, UNSPECIFIED TYPE: Primary | ICD-10-CM

## 2025-01-14 DIAGNOSIS — R76.8 HEPATITIS B ANTIBODY POSITIVE: ICD-10-CM

## 2025-01-14 LAB
ALBUMIN SERPL BCP-MCNC: 4.6 G/DL (ref 3.4–5)
ALP SERPL-CCNC: 60 U/L (ref 33–136)
ALT SERPL W P-5'-P-CCNC: 15 U/L (ref 7–45)
ANION GAP SERPL CALC-SCNC: 14 MMOL/L (ref 10–20)
AST SERPL W P-5'-P-CCNC: 13 U/L (ref 9–39)
BASOPHILS # BLD AUTO: 0.01 X10*3/UL (ref 0–0.1)
BASOPHILS NFR BLD AUTO: 0.2 %
BILIRUB SERPL-MCNC: 0.8 MG/DL (ref 0–1.2)
BUN SERPL-MCNC: 18 MG/DL (ref 6–23)
CALCIUM SERPL-MCNC: 9.4 MG/DL (ref 8.6–10.6)
CHLORIDE SERPL-SCNC: 107 MMOL/L (ref 98–107)
CHOLEST SERPL-MCNC: 249 MG/DL (ref 0–199)
CHOLESTEROL/HDL RATIO: 4.3
CO2 SERPL-SCNC: 23 MMOL/L (ref 21–32)
CREAT SERPL-MCNC: 0.67 MG/DL (ref 0.5–1.05)
EGFRCR SERPLBLD CKD-EPI 2021: >90 ML/MIN/1.73M*2
EOSINOPHIL # BLD AUTO: 0.04 X10*3/UL (ref 0–0.7)
EOSINOPHIL NFR BLD AUTO: 1 %
ERYTHROCYTE [DISTWIDTH] IN BLOOD BY AUTOMATED COUNT: 12.7 % (ref 11.5–14.5)
FERRITIN SERPL-MCNC: 212 NG/ML (ref 8–150)
GLUCOSE SERPL-MCNC: 102 MG/DL (ref 74–99)
HCT VFR BLD AUTO: 43.1 % (ref 36–46)
HDLC SERPL-MCNC: 57.4 MG/DL
HGB BLD-MCNC: 13.8 G/DL (ref 12–16)
IMM GRANULOCYTES # BLD AUTO: 0.01 X10*3/UL (ref 0–0.7)
IMM GRANULOCYTES NFR BLD AUTO: 0.2 % (ref 0–0.9)
IRON SATN MFR SERPL: 37 % (ref 25–45)
IRON SERPL-MCNC: 116 UG/DL (ref 35–150)
LDLC SERPL CALC-MCNC: 173 MG/DL
LYMPHOCYTES # BLD AUTO: 1.56 X10*3/UL (ref 1.2–4.8)
LYMPHOCYTES NFR BLD AUTO: 37.1 %
MCH RBC QN AUTO: 31.3 PG (ref 26–34)
MCHC RBC AUTO-ENTMCNC: 32 G/DL (ref 32–36)
MCV RBC AUTO: 98 FL (ref 80–100)
MONOCYTES # BLD AUTO: 0.3 X10*3/UL (ref 0.1–1)
MONOCYTES NFR BLD AUTO: 7.1 %
NEUTROPHILS # BLD AUTO: 2.28 X10*3/UL (ref 1.2–7.7)
NEUTROPHILS NFR BLD AUTO: 54.4 %
NON HDL CHOLESTEROL: 192 MG/DL (ref 0–149)
NRBC BLD-RTO: 0 /100 WBCS (ref 0–0)
PLATELET # BLD AUTO: 251 X10*3/UL (ref 150–450)
POTASSIUM SERPL-SCNC: 4.2 MMOL/L (ref 3.5–5.3)
PROT SERPL-MCNC: 7.1 G/DL (ref 6.4–8.2)
RBC # BLD AUTO: 4.41 X10*6/UL (ref 4–5.2)
SODIUM SERPL-SCNC: 140 MMOL/L (ref 136–145)
TIBC SERPL-MCNC: 316 UG/DL (ref 240–445)
TRIGL SERPL-MCNC: 92 MG/DL (ref 0–149)
UIBC SERPL-MCNC: 200 UG/DL (ref 110–370)
VLDL: 18 MG/DL (ref 0–40)
WBC # BLD AUTO: 4.2 X10*3/UL (ref 4.4–11.3)

## 2025-01-14 PROCEDURE — 3008F BODY MASS INDEX DOCD: CPT | Performed by: INTERNAL MEDICINE

## 2025-01-14 PROCEDURE — 99204 OFFICE O/P NEW MOD 45 MIN: CPT | Performed by: INTERNAL MEDICINE

## 2025-01-14 PROCEDURE — 85025 COMPLETE CBC W/AUTO DIFF WBC: CPT

## 2025-01-14 PROCEDURE — 80061 LIPID PANEL: CPT

## 2025-01-14 PROCEDURE — 80053 COMPREHEN METABOLIC PANEL: CPT

## 2025-01-14 PROCEDURE — 90471 IMMUNIZATION ADMIN: CPT | Performed by: INTERNAL MEDICINE

## 2025-01-14 PROCEDURE — 99213 OFFICE O/P EST LOW 20 MIN: CPT | Performed by: INTERNAL MEDICINE

## 2025-01-14 PROCEDURE — 83540 ASSAY OF IRON: CPT

## 2025-01-14 PROCEDURE — 1036F TOBACCO NON-USER: CPT | Performed by: INTERNAL MEDICINE

## 2025-01-14 PROCEDURE — 90656 IIV3 VACC NO PRSV 0.5 ML IM: CPT | Performed by: INTERNAL MEDICINE

## 2025-01-14 PROCEDURE — 82728 ASSAY OF FERRITIN: CPT

## 2025-01-14 PROCEDURE — 99214 OFFICE O/P EST MOD 30 MIN: CPT | Performed by: INTERNAL MEDICINE

## 2025-01-14 PROCEDURE — 83550 IRON BINDING TEST: CPT

## 2025-01-14 ASSESSMENT — PAIN SCALES - GENERAL: PAINLEVEL_OUTOF10: 0-NO PAIN

## 2025-01-14 NOTE — PROGRESS NOTES
HEPATOLOGY NEW PATIENT VISIT    2025    Dr. Breezy Burns       Patient Name:   ALESIA PAGE  Medical Record Number: [id number]  YOB: 1960    Dear Dr. Burns,    I had the pleasure of seeing Alesia Page (along with her ) for consultation in the [facility] ([facility] office). History and physical examination was performed. Pertinent available laboratory, imaging, pathology results were reviewed.     I did offer an audio or video .  They refused.    History of Present Illness:   The patient is a 64 year old Chinese female who is apparently referred for evaluation of immunity to hepatitis B from previous infection with positive hepatitis B core antibody.    The patient has a history of leukopenia dating back several years ago.  She was seen by hematology/oncology.  For unclear reasons, she had testing for hepatitis B immunity.  She was found to be immune from a previous infection with positive hepatitis B core antibody and hepatitis B surface antibody.  Apparently, because of that, the hematology clinic referred her to me for further evaluation.    The patient denied any past history of acute hepatitis or jaundice.      She reports that she was never even aware until today that she actually had had hepatitis B in the past.    She has never had any manifestations of liver disease including no jaundice, ascites, hepatic encephalopathy, or variceal bleeding. She denied ever having a liver biopsy.    She presented today for evaluation.  She denied any specific liver-related complaints.     Past Medical/Surgical History:   Abnormal EKG  Blurring of visual image  Hyperlipidemia  Hyperthyroidism  Impaired exercise tolerance  Leukopenia  Menopausal symptoms  Neck pain  Overactive bladder  Recurrent UTI  Shortness of breath  Weight loss    Family History:   Father  from lung cancer.  He was a smoker.  There is no known family history of liver  disease.  There is no known family history of colon cancer.    Social History:   She denied tobacco use.  She denied alcohol use.  She denied intravenous drug use.  She denied blood transfusions.  She denied tattoos.    Review of systems: As noted above.  She occasionally gets left knee pain.  No fever, chills, weight loss, visual changes, auditory changes, shortness of breath, chest pain, abdominal pain, GI bleeding, diarrhea, constipation, depression, dysuria, hematuria, or rash.    Medical, Surgical, Family, and Social Histories  Past Medical History:   Diagnosis Date    Acute pharyngitis, unspecified 06/08/2020    Sore throat in the morning    Change in bowel habit 12/24/2019    Change in bowel habits    Encounter for screening mammogram for malignant neoplasm of breast     Other screening mammogram    Other cervical disc degeneration, unspecified cervical region 09/17/2015    DDD (degenerative disc disease), cervical    Other fatigue 04/01/2021    Other fatigue    Other intervertebral disc degeneration, lumbar region 09/17/2015    DDD (degenerative disc disease), lumbar    Personal history of other diseases of the musculoskeletal system and connective tissue 02/03/2015    History of low back pain    Personal history of other diseases of the musculoskeletal system and connective tissue 02/03/2015    History of neck pain    Personal history of other diseases of the nervous system and sense organs 01/19/2018    History of blurred vision    Personal history of other endocrine, nutritional and metabolic disease 09/14/2015    History of hyperlipidemia    Personal history of other endocrine, nutritional and metabolic disease 04/01/2021    History of hyperthyroidism    Personal history of other specified conditions 04/01/2021    History of exercise intolerance    Personal history of other specified conditions 04/01/2021    History of weight loss    Personal history of other specified conditions 04/01/2021    History of  shortness of breath    Personal history of other specified conditions 02/12/2018    History of urinary frequency    Personal history of other specified conditions 08/31/2017    History of vertigo    Spondylosis without myelopathy or radiculopathy, cervical region 08/31/2017    DJD (degenerative joint disease) of cervical spine    Spondylosis without myelopathy or radiculopathy, lumbar region 08/31/2017    DJD (degenerative joint disease), lumbar    Unspecified symptoms and signs involving the genitourinary system 05/27/2020    UTI symptoms       Past Surgical History:   Procedure Laterality Date    COLONOSCOPY  02/24/2015    Colonoscopy (Fiberoptic)       No family history on file.    Social History     Socioeconomic History    Marital status:      Spouse name: Not on file    Number of children: Not on file    Years of education: Not on file    Highest education level: Not on file   Occupational History    Not on file   Tobacco Use    Smoking status: Never    Smokeless tobacco: Never   Substance and Sexual Activity    Alcohol use: Not Currently    Drug use: Never    Sexual activity: Not on file   Other Topics Concern    Not on file   Social History Narrative    Not on file     Social Drivers of Health     Financial Resource Strain: Not on file   Food Insecurity: No Food Insecurity (10/23/2024)    Hunger Vital Sign     Worried About Running Out of Food in the Last Year: Never true     Ran Out of Food in the Last Year: Never true   Transportation Needs: Not on file   Physical Activity: Not on file   Stress: Not on file   Social Connections: Not on file   Intimate Partner Violence: Not on file   Housing Stability: Not on file       Allergies and Current Medications  No Known Allergies  Current Outpatient Medications   Medication Sig Dispense Refill    rosuvastatin (Crestor) 5 mg tablet Take 1 tablet (5 mg) by mouth once daily. 100 tablet 3     No current facility-administered medications for this visit.     "    Physical Exam  /70   Pulse 70   Ht 1.702 m (5' 7\")   Wt 79.4 kg (175 lb)   SpO2 96%   BMI 27.41 kg/m²       Physical Examination:   General Appearance: alert and in no acute distress.   HEENT: oropharynx without lesions. Anicteric sclerae.   Neck supple, nontender, without adenopathy, thyromegaly, or JVD.   Lungs clear to auscultation and percussion.   Heart RRR without murmurs, rubs, or gallops.   Abdomen: Soft, nontender, bowel sounds positive, without obvious ascites. Liver and spleen not palpable.   Extremities full ROM, no atrophy, normal strength. No edema.   Skin no specific lesions.   Neurological exam nonfocal, alert and oriented.   No spider angiomata, or palmar erythema.     Labs 10/23/2024 hepatitis B surface antigen negative, INR 1, ferritin 195, iron saturation 40%, , hepatitis B core IgM negative, hepatitis B core total antibody positive, hepatitis C antibody negative, HIV negative, glucose 107, sodium 140, creatinine 0.71, protein 6.8, albumin 4.1, alk phos 58, bilirubin 0.6, AST 13, ALT 13.    Labs 7/31/2024 hemoglobin A1c 5.3%, cholesterol 289, , triglycerides 94, WBC 3.3, hemoglobin 13.7, platelets 106.    Labs 10/14/2022 AST 16, ALT 18, WBC 4, hemoglobin 14.5, platelets 245, TSH 1.86.    CT C/A/P with contrast 1/8/2025:  IMPRESSION:      1. There is a low-density heterogeneous region within the uterine  endometrium which is incompletely characterized on the basis of this  examination. Differential considerations include endometrial fluid,  polyp or a mass lesion. Further evaluation with a pelvic ultrasound  is recommended to rule out underlying primary uterine malignancy.  2. No lymphadenopathy in the chest abdomen or pelvis.  3. 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  4. Cholelithiasis without evidence of acute cholecystitis.  5. Indeterminate 1.4 cm lucent region within the posterior left iliac  bone may relate to osteoporosis versus " intraosseous lipoma.  6. LIVER: Normal size enhancement. Indeterminate 5 mm low-density lesion  in the left hepatic lobe, too small to characterize however may  represent a cyst.          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    In fact, her LFTs are normal and her hepatitis B surface antigen is negative.    She was apparently told to come see me to see if liver issues would explain her low white blood cell count.  With normal LFTs, and no evidence of portal hypertension or splenomegaly, there is nothing to explain that from a liver standpoint.  She can follow-up with hematology.    Unless hematology is planning on significantly immunosuppressed seeing her with something such as Rituxan for hematologic issues, there is nothing specific that we would need to do in this case.  All this represents is previous hepatitis infection with resolution of the infection and now immunity.    If she were to be significantly immunosuppressed, there is a theoretical risk of reactivation.  If that is the case, she can be sent back to us for further evaluation.    I did recommend that her significant other and children be tested for hepatitis B.  She can also discuss with any other first-degree relatives.    There is probably a tiny liver cyst.  I will have her get an ultrasound in 6 months.  We will call her back with the results.  Unless these is anything concerning on that study, she would not need to see me back in the liver clinic.    Thank you for allowing me to participate in the care of this patient. Please feel free to contact me with any questions regarding their care.     Sincerely,     Elieser Ling MD, FAASLD, FACG.   Medical Director, Hepatology  Senior Attending  Physician  [facility]  [facility]  Professor of Medicine  Division of Gastroenterology and Liver Disease  [employer]  [address] [place] [id number]  Phone: [phone]  Fax: [phone].      Cc: BREN Toure      This document was generated with a computerized dictation system.  Because of this, there could be errors in grammar and/or content.

## 2025-01-14 NOTE — PATIENT INSTRUCTIONS
Your entire family should be tested for hepatitis B.    You can get an ultrasound of the liver in 6 months.    Follow-up with Estrellita Mccurdy in hematology for the low white blood cell count.

## 2025-01-14 NOTE — PROGRESS NOTES
"Subjective   Patient ID: Marian Garcia is a 64 y.o. female who presents for Follow-up.    HPI     Review of Systems    Objective   /70   Pulse 70   Ht 1.676 m (5' 6\")   Wt 78.9 kg (174 lb)   SpO2 96%   BMI 28.08 kg/m²     Physical Exam    Assessment/Plan   {Assess/PlanSmartLinks:71706}       "

## 2025-01-16 ENCOUNTER — HOSPITAL ENCOUNTER (OUTPATIENT)
Dept: RADIOLOGY | Facility: CLINIC | Age: 65
End: 2025-01-16
Payer: COMMERCIAL

## 2025-01-21 ENCOUNTER — HOSPITAL ENCOUNTER (OUTPATIENT)
Dept: RADIOLOGY | Facility: CLINIC | Age: 65
Discharge: HOME | End: 2025-01-21
Payer: COMMERCIAL

## 2025-01-21 DIAGNOSIS — R93.5 ABNORMAL CT OF THE ABDOMEN: ICD-10-CM

## 2025-01-21 PROCEDURE — 76856 US EXAM PELVIC COMPLETE: CPT | Performed by: RADIOLOGY

## 2025-01-21 PROCEDURE — 76830 TRANSVAGINAL US NON-OB: CPT | Performed by: RADIOLOGY

## 2025-01-21 PROCEDURE — 76830 TRANSVAGINAL US NON-OB: CPT

## 2025-01-23 ENCOUNTER — PREP FOR PROCEDURE (OUTPATIENT)
Dept: OBSTETRICS AND GYNECOLOGY | Facility: CLINIC | Age: 65
End: 2025-01-23
Payer: COMMERCIAL

## 2025-01-27 ENCOUNTER — OFFICE VISIT (OUTPATIENT)
Dept: CARDIOLOGY | Facility: CLINIC | Age: 65
End: 2025-01-27
Payer: COMMERCIAL

## 2025-01-27 VITALS
BODY MASS INDEX: 27.48 KG/M2 | SYSTOLIC BLOOD PRESSURE: 119 MMHG | HEIGHT: 67 IN | OXYGEN SATURATION: 96 % | HEART RATE: 70 BPM | WEIGHT: 175.1 LBS | DIASTOLIC BLOOD PRESSURE: 83 MMHG

## 2025-01-27 DIAGNOSIS — R93.5 ABNORMAL CT OF THE ABDOMEN: ICD-10-CM

## 2025-01-27 DIAGNOSIS — I72.8 SPLENIC ARTERY ANEURYSM (CMS-HCC): Primary | ICD-10-CM

## 2025-01-27 PROCEDURE — 3008F BODY MASS INDEX DOCD: CPT | Performed by: INTERNAL MEDICINE

## 2025-01-27 PROCEDURE — 1036F TOBACCO NON-USER: CPT | Performed by: INTERNAL MEDICINE

## 2025-01-27 PROCEDURE — 99214 OFFICE O/P EST MOD 30 MIN: CPT | Performed by: INTERNAL MEDICINE

## 2025-01-27 PROCEDURE — 99204 OFFICE O/P NEW MOD 45 MIN: CPT | Performed by: INTERNAL MEDICINE

## 2025-01-27 ASSESSMENT — PAIN SCALES - GENERAL: PAINLEVEL_OUTOF10: 0-NO PAIN

## 2025-01-27 ASSESSMENT — COLUMBIA-SUICIDE SEVERITY RATING SCALE - C-SSRS
1. IN THE PAST MONTH, HAVE YOU WISHED YOU WERE DEAD OR WISHED YOU COULD GO TO SLEEP AND NOT WAKE UP?: NO
6. HAVE YOU EVER DONE ANYTHING, STARTED TO DO ANYTHING, OR PREPARED TO DO ANYTHING TO END YOUR LIFE?: NO
2. HAVE YOU ACTUALLY HAD ANY THOUGHTS OF KILLING YOURSELF?: NO

## 2025-01-27 ASSESSMENT — PATIENT HEALTH QUESTIONNAIRE - PHQ9
SUM OF ALL RESPONSES TO PHQ9 QUESTIONS 1 AND 2: 0
2. FEELING DOWN, DEPRESSED OR HOPELESS: NOT AT ALL
1. LITTLE INTEREST OR PLEASURE IN DOING THINGS: NOT AT ALL

## 2025-01-27 ASSESSMENT — ENCOUNTER SYMPTOMS
OCCASIONAL FEELINGS OF UNSTEADINESS: 0
LOSS OF SENSATION IN FEET: 0
DEPRESSION: 0

## 2025-01-27 NOTE — PROGRESS NOTES
Referred by Estrellita Mccurdy CNP      History of Present Illness:  Marian Garcia is a/an 64 y.o. woman with longstanding leukopenia referred for calcified splenic artery aneurysm seen incidentally on imaging.    No family history of aneurysm.    Denies abdominal pain.    Recently started on rosuvastatin for hyperlipidemia. Aortic atherosclerosis seen on imaging.    Past Medical History:   Diagnosis Date    Acute pharyngitis, unspecified 06/08/2020    Sore throat in the morning    Change in bowel habit 12/24/2019    Change in bowel habits    Encounter for screening mammogram for malignant neoplasm of breast     Other screening mammogram    Other cervical disc degeneration, unspecified cervical region 09/17/2015    DDD (degenerative disc disease), cervical    Other fatigue 04/01/2021    Other fatigue    Other intervertebral disc degeneration, lumbar region 09/17/2015    DDD (degenerative disc disease), lumbar    Personal history of other diseases of the musculoskeletal system and connective tissue 02/03/2015    History of low back pain    Personal history of other diseases of the musculoskeletal system and connective tissue 02/03/2015    History of neck pain    Personal history of other diseases of the nervous system and sense organs 01/19/2018    History of blurred vision    Personal history of other endocrine, nutritional and metabolic disease 09/14/2015    History of hyperlipidemia    Personal history of other endocrine, nutritional and metabolic disease 04/01/2021    History of hyperthyroidism    Personal history of other specified conditions 04/01/2021    History of exercise intolerance    Personal history of other specified conditions 04/01/2021    History of weight loss    Personal history of other specified conditions 04/01/2021    History of shortness of breath    Personal history of other specified conditions 02/12/2018    History of urinary frequency    Personal history of other specified conditions 08/31/2017     "History of vertigo    Spondylosis without myelopathy or radiculopathy, cervical region 08/31/2017    DJD (degenerative joint disease) of cervical spine    Spondylosis without myelopathy or radiculopathy, lumbar region 08/31/2017    DJD (degenerative joint disease), lumbar    Unspecified symptoms and signs involving the genitourinary system 05/27/2020    UTI symptoms     Past Surgical History:   Procedure Laterality Date    COLONOSCOPY  02/24/2015    Colonoscopy (Fiberoptic)     Social History     Tobacco Use    Smoking status: Never    Smokeless tobacco: Never   Substance Use Topics    Alcohol use: Not Currently    Drug use: Never     No family history on file.  Current Outpatient Medications   Medication Sig Dispense Refill    rosuvastatin (Crestor) 5 mg tablet Take 1 tablet (5 mg) by mouth once daily. 100 tablet 3     No current facility-administered medications for this visit.       Physical Examination:  Blood pressure 119/83, pulse 70, height 1.702 m (5' 7\"), weight 79.4 kg (175 lb 1.6 oz), SpO2 96%.  General: well-developed, well-nourished, no distress  Head: normocephalic, atraumatic  Eyes: PERRL, anicteric sclerae, no conjunctival injection  ENT: normal oropharynx  Neck: supple, no carotid bruits, no JVD  Lungs: normal respiratory effort, clear to auscultation bilaterally  Heart: regular, normal S1 and S2, no murmurs, rubs or gallops  Abdomen: normal active bowel sounds, soft, non-distended, non-tender  Extremities: no cyanosis or clubbing  Vascular: no edema, pulses 2+ and symmetric  Musculoskeletal: no deformities  Neurological: alert and oriented, no gross neurological deficits  Psychological: normal mood and affect   Skin: warm and dry, no rashes or lesions        Pertinent Labs:    Pertinent Imaging:  CT abdomen/pelvis 1/8/2025  IMPRESSION:      1. There is a low-density heterogeneous region within the uterine  endometrium which is incompletely characterized on the basis of this  examination. " Differential considerations include endometrial fluid,  polyp or a mass lesion. Further evaluation with a pelvic ultrasound  is recommended to rule out underlying primary uterine malignancy.  2. No lymphadenopathy in the chest abdomen or pelvis.  3. 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  4. Cholelithiasis without evidence of acute cholecystitis.  5. Indeterminate 1.4 cm lucent region within the posterior left iliac  bone may relate to osteoporosis versus intraosseous lipoma.    Diagnoses and all orders for this visit:  Splenic artery aneurysm (CMS-HCC) (Primary)  -     CT abdomen pelvis w IV contrast; Future  Abnormal CT of the abdomen  -     Referral to Vascular Medicine  Repeat imaging one year    Rabia Aguirre MD, MS

## 2025-01-27 NOTE — PATIENT INSTRUCTIONS
Please get scheduled for a CT of the abdomen/pelvis in one year.    To schedule your test, you should call the radiology scheduling line at [phone].    It typically takes 7-10 business days to pre-certify for the test.    Should you have questions, please do not hesitate to call the office at [phone], or you can reach me on [employer] if you have signed up for it. If you have urgent concerns, call the office, do not use [employer].

## 2025-02-04 ENCOUNTER — PREP FOR PROCEDURE (OUTPATIENT)
Dept: OBSTETRICS AND GYNECOLOGY | Facility: CLINIC | Age: 65
End: 2025-02-04

## 2025-02-04 ENCOUNTER — OFFICE VISIT (OUTPATIENT)
Dept: OBSTETRICS AND GYNECOLOGY | Facility: CLINIC | Age: 65
End: 2025-02-04
Payer: COMMERCIAL

## 2025-02-04 VITALS
SYSTOLIC BLOOD PRESSURE: 140 MMHG | WEIGHT: 175 LBS | DIASTOLIC BLOOD PRESSURE: 85 MMHG | HEIGHT: 67 IN | BODY MASS INDEX: 27.47 KG/M2

## 2025-02-04 DIAGNOSIS — R93.89 THICKENED ENDOMETRIUM: Primary | ICD-10-CM

## 2025-02-04 DIAGNOSIS — R93.89 ENDOMETRIAL THICKENING ON ULTRASOUND: Primary | ICD-10-CM

## 2025-02-04 DIAGNOSIS — Z01.419 PAP TEST, AS PART OF ROUTINE GYNECOLOGICAL EXAMINATION: ICD-10-CM

## 2025-02-04 DIAGNOSIS — Z01.818 PREOPERATIVE EXAM FOR GYNECOLOGIC SURGERY: ICD-10-CM

## 2025-02-04 PROCEDURE — 1036F TOBACCO NON-USER: CPT | Performed by: OBSTETRICS & GYNECOLOGY

## 2025-02-04 PROCEDURE — 99204 OFFICE O/P NEW MOD 45 MIN: CPT | Performed by: OBSTETRICS & GYNECOLOGY

## 2025-02-04 PROCEDURE — 99214 OFFICE O/P EST MOD 30 MIN: CPT | Performed by: OBSTETRICS & GYNECOLOGY

## 2025-02-04 PROCEDURE — 3008F BODY MASS INDEX DOCD: CPT | Performed by: OBSTETRICS & GYNECOLOGY

## 2025-02-04 RX ORDER — CELECOXIB 400 MG/1
400 CAPSULE ORAL ONCE
OUTPATIENT
Start: 2025-02-04 | End: 2025-02-04

## 2025-02-04 SDOH — HEALTH STABILITY: PHYSICAL HEALTH: ON AVERAGE, HOW MANY DAYS PER WEEK DO YOU ENGAGE IN MODERATE TO STRENUOUS EXERCISE (LIKE A BRISK WALK)?: 4 DAYS

## 2025-02-04 SDOH — ECONOMIC STABILITY: TRANSPORTATION INSECURITY
IN THE PAST 12 MONTHS, HAS LACK OF TRANSPORTATION KEPT YOU FROM MEETINGS, WORK, OR FROM GETTING THINGS NEEDED FOR DAILY LIVING?: NO

## 2025-02-04 SDOH — ECONOMIC STABILITY: TRANSPORTATION INSECURITY
IN THE PAST 12 MONTHS, HAS THE LACK OF TRANSPORTATION KEPT YOU FROM MEDICAL APPOINTMENTS OR FROM GETTING MEDICATIONS?: NO

## 2025-02-04 SDOH — HEALTH STABILITY: PHYSICAL HEALTH: ON AVERAGE, HOW MANY MINUTES DO YOU ENGAGE IN EXERCISE AT THIS LEVEL?: 40 MIN

## 2025-02-04 ASSESSMENT — LIFESTYLE VARIABLES
SKIP TO QUESTIONS 9-10: 1
HOW OFTEN DO YOU HAVE SIX OR MORE DRINKS ON ONE OCCASION: NEVER
HOW MANY STANDARD DRINKS CONTAINING ALCOHOL DO YOU HAVE ON A TYPICAL DAY: PATIENT DOES NOT DRINK
HOW OFTEN DO YOU HAVE A DRINK CONTAINING ALCOHOL: NEVER
AUDIT-C TOTAL SCORE: 0

## 2025-02-04 ASSESSMENT — SOCIAL DETERMINANTS OF HEALTH (SDOH)
WITHIN THE LAST YEAR, HAVE YOU BEEN AFRAID OF YOUR PARTNER OR EX-PARTNER?: NO
WITHIN THE LAST YEAR, HAVE YOU BEEN HUMILIATED OR EMOTIONALLY ABUSED IN OTHER WAYS BY YOUR PARTNER OR EX-PARTNER?: NO
WITHIN THE LAST YEAR, HAVE YOU BEEN KICKED, HIT, SLAPPED, OR OTHERWISE PHYSICALLY HURT BY YOUR PARTNER OR EX-PARTNER?: NO
IN THE PAST 12 MONTHS, HAS THE ELECTRIC, GAS, OIL, OR WATER COMPANY THREATENED TO SHUT OFF SERVICE IN YOUR HOME?: NO
WITHIN THE LAST YEAR, HAVE TO BEEN RAPED OR FORCED TO HAVE ANY KIND OF SEXUAL ACTIVITY BY YOUR PARTNER OR EX-PARTNER?: NO
HOW HARD IS IT FOR YOU TO PAY FOR THE VERY BASICS LIKE FOOD, HOUSING, MEDICAL CARE, AND HEATING?: NOT HARD AT ALL

## 2025-02-04 ASSESSMENT — ENCOUNTER SYMPTOMS
ENDOCRINE NEGATIVE: 0
RESPIRATORY NEGATIVE: 0
CARDIOVASCULAR NEGATIVE: 0
CONSTITUTIONAL NEGATIVE: 0
MUSCULOSKELETAL NEGATIVE: 0
ALLERGIC/IMMUNOLOGIC NEGATIVE: 0
HEMATOLOGIC/LYMPHATIC NEGATIVE: 0
OCCASIONAL FEELINGS OF UNSTEADINESS: 0
GASTROINTESTINAL NEGATIVE: 0
LOSS OF SENSATION IN FEET: 0
NEUROLOGICAL NEGATIVE: 0
EYES NEGATIVE: 0
PSYCHIATRIC NEGATIVE: 0

## 2025-02-04 ASSESSMENT — PAIN SCALES - GENERAL: PAINLEVEL_OUTOF10: 0-NO PAIN

## 2025-02-04 NOTE — PROGRESS NOTES
Subjective   Patient ID: Marian Garcia is a 64 y.o. female who presents for New Patient Visit (Patient is here for U/S no pap on file last mammogram 1/8/25 benign).  HPI  Patient was referred to GYN due to thickened endometrium on ultrasound.  Denies any vaginal bleeding.  Denies any nausea vomiting fever chills no GI or  complaints.  Patient has a history of leukopenia was being worked up when the ultrasound was done.  Incidental finding  Patient is sexually active with steady partner    Review of Systems  Negative review of systems    Objective   Physical Exam  Vitals reviewed.   Constitutional:       Appearance: Normal appearance.   Cardiovascular:      Rate and Rhythm: Normal rate and regular rhythm.   Pulmonary:      Effort: Pulmonary effort is normal.      Breath sounds: Normal breath sounds.   Abdominal:      General: Abdomen is flat. Bowel sounds are normal. There is no distension.      Palpations: Abdomen is soft. There is no mass.      Tenderness: There is no abdominal tenderness.   Genitourinary:     Comments: External genitalia unremarkable  Vagina clear  Cervix small posterior.  Difficult to visualize.  Patient uncomfortable with the speculum  Uterus appears normal anteverted  Adnexa masses or tenderness  Perineum without lesions or swelling  Musculoskeletal:      Cervical back: Normal range of motion and neck supple.   Neurological:      General: No focal deficit present.      Mental Status: She is alert.   Psychiatric:         Mood and Affect: Mood normal.         Behavior: Behavior normal.         Thought Content: Thought content normal.         Judgment: Judgment normal.     Assessment/Plan   Diagnoses and all orders for this visit:  Endometrial thickening on ultrasound  -     Referral to Gynecology  Pap test, as part of routine gynecological examination  -     Referral to Gynecology  Preoperative exam for gynecologic surgery     Reviewed options with patient.  Currently chart review shows ultrasound  with thickened endometrium CT scan noted.  Patient has no history of bleeding.  Reviewed with patient and partner.  Thickened endometrium by itself should be investigated due to possibility of endometrial hyperplasia or carcinoma.  Endometrial polyps also possible.  Discussed doing diagnostic hysteroscopy D&C in hospital is very uncomfortable during office exam.  Bleeding would not be possible doing endometrial biopsy satisfactorily here.  Patient is willing to have this done.  Will plan on scheduling.    Sung Simental MD 02/04/25 1:38 PM

## 2025-02-05 ENCOUNTER — TELEPHONE (OUTPATIENT)
Dept: OBSTETRICS AND GYNECOLOGY | Facility: CLINIC | Age: 65
End: 2025-02-05
Payer: COMMERCIAL

## 2025-02-06 ENCOUNTER — TELEPHONE (OUTPATIENT)
Dept: OBSTETRICS AND GYNECOLOGY | Facility: CLINIC | Age: 65
End: 2025-02-06
Payer: COMMERCIAL

## 2025-02-10 PROBLEM — R93.89 THICKENED ENDOMETRIUM: Status: ACTIVE | Noted: 2025-02-04

## 2025-02-12 LAB
CYTOLOGY CMNT CVX/VAG CYTO-IMP: NORMAL
HPV HR 12 DNA GENITAL QL NAA+PROBE: NEGATIVE
HPV HR GENOTYPES PNL CVX NAA+PROBE: NEGATIVE
HPV16 DNA SPEC QL NAA+PROBE: NEGATIVE
HPV18 DNA SPEC QL NAA+PROBE: NEGATIVE
LAB AP HPV GENOTYPE QUESTION: YES
LAB AP HPV HR: NORMAL
LABORATORY COMMENT REPORT: NORMAL
PATH REPORT.TOTAL CANCER: NORMAL

## 2025-02-24 ENCOUNTER — TELEMEDICINE (OUTPATIENT)
Dept: HEMATOLOGY/ONCOLOGY | Facility: CLINIC | Age: 65
End: 2025-02-24
Payer: COMMERCIAL

## 2025-02-24 DIAGNOSIS — E53.8 B12 DEFICIENCY: Primary | ICD-10-CM

## 2025-02-24 DIAGNOSIS — D72.819 LEUKOPENIA, UNSPECIFIED TYPE: ICD-10-CM

## 2025-02-24 DIAGNOSIS — R10.9 ABDOMINAL PAIN, UNSPECIFIED ABDOMINAL LOCATION: ICD-10-CM

## 2025-02-24 DIAGNOSIS — D69.6 THROMBOCYTOPENIA (CMS-HCC): ICD-10-CM

## 2025-02-24 DIAGNOSIS — T14.8XXA BRUISE: ICD-10-CM

## 2025-02-24 DIAGNOSIS — R19.7 DIARRHEA, UNSPECIFIED TYPE: ICD-10-CM

## 2025-02-24 PROCEDURE — 99203 OFFICE O/P NEW LOW 30 MIN: CPT

## 2025-02-24 RX ORDER — LANOLIN ALCOHOL/MO/W.PET/CERES
1000 CREAM (GRAM) TOPICAL 3 TIMES WEEKLY
Qty: 30 TABLET | Refills: 2 | Status: SHIPPED | OUTPATIENT
Start: 2025-02-24

## 2025-02-24 NOTE — PROGRESS NOTES
"[facility]    PATIENT VISIT INFORMATION    Visit Type: Follow up Visit  I performed this visit using real-time telehealth tools, including an audio/video connection between (Marian Garcia at her home) and (Estrellita Mccurdy, CNP [facility])  Patient consents to telemedicine service today and understands the limitations of the visit, no physical examination and all issues may not be able to be addressed today, other than brief neuro and psych assessment.   Referring Provider: Breezy Burns MD  Reason for referral: Leukopenia  Primary Practice Provider: Breezy Burns MD    HEMATOLOGY HISTORY    64-year-old female presents for evaluation of leukopenia.  Upon review of patient's medical records mild leukopenia dating back to 12/27/2019 and one episode of thrombocytopenia 2 months ago.  8/22/2023 bilateral mammogram completed with no signs of malignancy.  2/6/2023 colonoscopy performed and reported as unremarkable.  Patient does not take any medications though most recently noted hypercholesteremia.  2/24/2025 patient is now taking Crestor.  HISTORY OF PRESENT ILLNESS     ID Statement: Marian Garcia is a 64-year-old female    Chief Complaint: \"Getting better\"     Interval History:   Patient presents for evaluation follow up of leukopenia. She for got to tell mw that she had gallstones over 40 years ago.      Better sleeping. No SOB, CP, or palpitations. No leg swelling. No rashes or sores. No bruising any longer. No abd pain. Colonoscopy in 2023. Cannot eat spicy or loose stools. No n/v/d/c, abd pain, no blood or melena. Frequency with urination. No hematuria. Knee pain from arthritis. Sometimes back pain with housework.     Better energy. Gained weight. Use to walk a lot. Good appetite. Trying to lose weight. No Lymphadenopathy or neuropathy. Hot intolerance. Kendra fever, drenching night sweats, chills, illness or infection. No neurological symptoms other than some dizziness.     PAST/CURRENT " HISTORY     MEDICAL/SURGICAL HISTORY  -Hypercholesteremia  -Hypothyroidism though not on medication over the last year  -Menopausal symptoms  -Leukopenia  -Abnormal EKG-  -shortness of breath    SOCIAL HISTORY  -Lives with  (two healthy children)   -Work place: Housewife   -Tobacco/smokeless use: Denies    -Alcohol: Denies   -Illicit drug or marijuana use: denies   -Druze or Spiritual beliefs: None   -Social Determinates of Health Concerns: None     FAMILY HISTORY  -Dad  at age 74 years old from lung cancer (smoker)   -Mom living 80 years old healthy   -Two sister healthy   -No other known history of hematologic, bleeding, clotting, autoimmune, genetic, or malignant disorders in the family.     OCCUPATIONAL/ENVIRONMENTAL HISTORY/EXPOSURES:  -None reported    Active Problems, Allergy List, Medication List, and PMH/PSH/FH/Social Hx have been reviewed and reconciled in chart. Updates made when necessary.     REVIEW OF SYSTEMS   A review of systems has been completed and are negative for complaints except what is stated in the assessment, HPI, IH, ROS, and/or past medical history.    ALLERGIES AND MEDICATIONS     Allergies and Intolerances:   No Known Allergies   Medication Profile:   Current Outpatient Medications   Medication Instructions    rosuvastatin (CRESTOR) 5 mg, oral, Daily      Available Vaccination Record:   Immunization History   Administered Date(s) Administered    COVID-19, mRNA, LNP-S, PF, 30 mcg/0.3 mL dose 2021, 2021    Flu vaccine, trivalent, preservative free, age 6 months and greater (Fluarix/Fluzone/Flulaval) 2025      PHYSICAL EXAM     Vital Signs/Measurements:       10/23/2024    12:48 PM 2025     2:28 PM 2025     8:18 AM 2025     2:06 PM 2025    11:12 AM 2025    11:13 AM 2025     1:18 PM   Vitals   Systolic 112  120 120 117 119 140   Diastolic 78  70 70 83 83 85   BP Location Right arm    Right arm Left arm    Heart Rate 69  70 70  "70     Temp 36.1 °C (97 °F)         Resp 17         Height 1.678 m (5' 6.06\")  1.678 m (5' 6.06\") 1.676 m (5' 6\") 1.702 m (5' 7\") 1.702 m (5' 7\")  1.702 m (5' 7\")   Weight (lb) 175.82 175 174 175 175.1  175   BMI 28.32 kg/m2 28.19 kg/m2 28.08 kg/m2 27.41 kg/m2 27.42 kg/m2  27.41 kg/m2   BSA (m2) 1.93 m2 1.92 m2 1.92 m2 1.94 m2 1.94 m2  1.94 m2   Visit Report Report  Report    Report Report    Report Report Report Report       Significant value      Performance:   ECOG Performance Status: 0     Grade ECOG performance status   0 Fully active, able to carry on all pre-disease performance without restriction   1 Restricted in physically strenuous activity but ambulatory and able to carry out work of a light or sedentary nature, e.g., light housework, office work   2 Ambulatory and capable of all selfcare but unable to carry out any work activities; Up and about more than 50% of waking hours   3 Capable of only limited selfcare, confined to bed or chair more than 50% of waking hours   4 Completely disabled; Cannot carry out any selfcare; Totally confined to bed or chair   5 Dead     Physical Exam:  General: Patient is awake/alert/oriented x3, no distress, Nourished, hydrated, alert and cooperative   Psychological: Intact recent and remote memory, judgement, and insight. Appropriate mood, affect, and behavior        RESULTS/DATA     Labs:     Lab Results   Component Value Date    WBC 4.2 (L) 01/14/2025    NEUTROABS 2.28 01/14/2025    IGABSOL 0.01 01/14/2025    LYMPHSABS 1.56 01/14/2025    MONOSABS 0.30 01/14/2025    EOSABS 0.04 01/14/2025    BASOSABS 0.01 01/14/2025    RBC 4.41 01/14/2025    MCV 98 01/14/2025    MCHC 32.0 01/14/2025    HGB 13.8 01/14/2025    HCT 43.1 01/14/2025     01/14/2025     Lab Results   Component Value Date    RETICCTPCT 1.6 10/23/2024      Lab Results   Component Value Date    CREATININE 0.67 01/14/2025    BUN 18 01/14/2025    EGFR >90 01/14/2025     01/14/2025    K 4.2 01/14/2025    "  01/14/2025    CO2 23 01/14/2025      Lab Results   Component Value Date    ALT 15 01/14/2025    AST 13 01/14/2025    ALKPHOS 60 01/14/2025    BILITOT 0.8 01/14/2025      Lab Results   Component Value Date    TSH 1.86 07/31/2024     Lab Results   Component Value Date    TSH 1.86 07/31/2024    H7NEJGH 95 07/31/2024    THYROIDPAB 580 (A) 04/05/2021     Lab Results   Component Value Date    IRON 116 01/14/2025    TIBC 316 01/14/2025    FERRITIN 212 (H) 01/14/2025      Lab Results   Component Value Date    FFNFWOUU71 551 10/23/2024      Lab Results   Component Value Date    FOLATE 22.1 10/23/2024     Lab Results   Component Value Date    EJ Positive (A) 10/23/2024    SEDRATE 9 10/23/2024      Lab Results   Component Value Date    CRP 0.19 10/23/2024     Lab Results   Component Value Date    SPEP Normal.    07/31/2024        Radiology/Studies:   CT chest abdomen and pelvis ordered and patient is due for her bilateral mammogram    ASSESSMENT/PLAN     Assessment and Plan:   #1.  Leukopenia  64-year-old female presents for evaluation of leukopenia.  Upon review of patient's medical records mild leukopenia dating back to 12/27/2019 and one episode of thrombocytopenia 2 months ago.  Discussed the possibilities of leukopenia.  Advised workup since over 3 to 4 years of notable low white count.  Patient in agreement to workup.  CT chest abdomen pelvis ordered due to multiple complaints of shortness of breath, abdominal pain and diarrhea.    8/22/2023 bilateral mammogram completed with no signs of malignancy.  2/6/2023 colonoscopy performed and reported as unremarkable.    ~Patient does not take any medications though most recently noted hypercholesteremia.  Advised patient to follow-up with primary care to start a statin or similar.  Discussed the importance of cardiovascular disease and health.    Discussion of results 12/6/2024: Patient will have hepatitis C surface antigen drawn.  Hepatitis B surface antibody is  reactive.  She will follow with hepatology.  Leukopenia is mild at 3.7 and platelets are normal.  NGS is negative for mutation.  Flow cytometry does not show any abnormality and cells.  No other concerns at this time she will follow with hepatology, imaging and repeat CBC after she sees appropriate specialties.  CT scan has been repeatedly denied by insurance they will give a final answer on 12/8/2024.  If denial patient will initiate with a chest x-ray and ultrasound of abdomen.  We will go from there.  Patient has started on statin.    02/24/2025 patient reports improvement in symptoms. Would like a B12 supplement sent to pharmacy. Leukopenia improved WBC 4.2. She followed with hepatology. Follow up in 1 year to repeat labs. Uterine Biopsy planned April 2025.     **Please follow with specialties as scheduled for other comorbidities and routine health screenings.**    Follow up:    RTC:  - 6 month follow-up with labs      Medications:  -B12    Imaging/Testing:  -NA    Referral:  -GYN    Other Pertinent Appointments:  -PCP 3/12/2025  -Cardiology 4/7/2025  -Gyn 4/23/2025      Patient Discussion Summary:  Discussed plan of care in detail. Patient states understanding and in agreement. Answered all questions. They will call with any additional questions and/or concerns.    Thank you for allowing me to participate in your care. It was a pleasure meeting you.    Sincerely,  NOVA Toure-CNP       Hematology/Oncology Clinical Nurse Practitioner     [facility]   [address]  [address]  [place] [id number]  Office #: [phone]    DISCLAIMER:   In preparing for this visit and writing this note, I reviewed all the previous electronic medical records (testing, labs, imaging, and other procedures, provider notes, and medical charts) of the patient available in the physician portal pertinent to patient care. Significant findings which helped in decision making are recorded  in this chart.  A few details copied from my note on 12/06/2024, the details have been updated and all reflect current decision making from today, 02/24/2025.    This document may have been written by voice recognition software.  There may be some incorrect wording, spelling and/or spelling errors or punctuation errors that were not corrected prior to committing the note to the medical record. Please request clarification if there is documentation error or clarification is needed.   Time based billing: Please see documentation within this specific encounter.

## 2025-03-03 ENCOUNTER — APPOINTMENT (OUTPATIENT)
Dept: HEMATOLOGY/ONCOLOGY | Facility: CLINIC | Age: 65
End: 2025-03-03
Payer: COMMERCIAL

## 2025-03-12 ENCOUNTER — APPOINTMENT (OUTPATIENT)
Dept: PRIMARY CARE | Facility: CLINIC | Age: 65
End: 2025-03-12
Payer: COMMERCIAL

## 2025-03-12 VITALS
WEIGHT: 176 LBS | HEIGHT: 67 IN | HEART RATE: 71 BPM | DIASTOLIC BLOOD PRESSURE: 72 MMHG | SYSTOLIC BLOOD PRESSURE: 122 MMHG | OXYGEN SATURATION: 97 % | BODY MASS INDEX: 27.62 KG/M2

## 2025-03-12 DIAGNOSIS — R73.02 IGT (IMPAIRED GLUCOSE TOLERANCE): ICD-10-CM

## 2025-03-12 DIAGNOSIS — E05.90 HYPERTHYROIDISM: ICD-10-CM

## 2025-03-12 DIAGNOSIS — E66.3 OVERWEIGHT (BMI 25.0-29.9): ICD-10-CM

## 2025-03-12 DIAGNOSIS — E78.5 HYPERLIPIDEMIA, UNSPECIFIED HYPERLIPIDEMIA TYPE: Primary | ICD-10-CM

## 2025-03-12 DIAGNOSIS — G47.00 INSOMNIA, UNSPECIFIED TYPE: ICD-10-CM

## 2025-03-12 PROCEDURE — 3008F BODY MASS INDEX DOCD: CPT | Performed by: INTERNAL MEDICINE

## 2025-03-12 PROCEDURE — 99213 OFFICE O/P EST LOW 20 MIN: CPT | Performed by: INTERNAL MEDICINE

## 2025-03-12 PROCEDURE — 1036F TOBACCO NON-USER: CPT | Performed by: INTERNAL MEDICINE

## 2025-03-12 NOTE — PROGRESS NOTES
"Subjective   Patient ID: Marian Garcia is a 64 y.o. female who presents for Follow-up.    HPI on cholesterol med for 3 mos w/o problems  Did not get labs  Wants thyroid checked  Wants sleeping pill for insomnia. Denies depression  Wants weight loss med. Says eats very little and exercises  Weight up 2 lbs    Review of Systems  As above    Objective   /72   Pulse 71   Ht 1.702 m (5' 7\")   Wt 79.8 kg (176 lb)   SpO2 97%   BMI 27.57 kg/m²     Physical Exam  GEN nad, Affect wnl  Heent ncat, eomfg, face symmetric  Skin good color  Resp breathing easily  No p/m retardation or agitation  Thinking appropriate  Oriented    Assessment/Plan   Diagnoses and all orders for this visit:  Hyperlipidemia, unspecified hyperlipidemia type  -     Comprehensive metabolic panel; Future  -     Lipid Panel; Future  Hyperthyroidism  -     Tsh With Reflex To Free T4 If Abnormal; Future  IGT (impaired glucose tolerance)  -     Hemoglobin A1C; Future  Insomnia, unspecified type  Overweight (BMI 25.0-29.9)     Limit calories, exercise  Melatonin for sleep  Cmp, lipid, tsh, hgb aic  Life style modifications for elevated glucose including daily exercise, weight management and carbohydrate limitation discussed.  Fu specialists  If labs ok, fu 12 mos  "

## 2025-03-13 DIAGNOSIS — R73.02 IGT (IMPAIRED GLUCOSE TOLERANCE): Primary | ICD-10-CM

## 2025-03-13 LAB
ALBUMIN SERPL-MCNC: 4.7 G/DL (ref 3.6–5.1)
ALP SERPL-CCNC: 57 U/L (ref 37–153)
ALT SERPL-CCNC: 14 U/L (ref 6–29)
ANION GAP SERPL CALCULATED.4IONS-SCNC: 8 MMOL/L (CALC) (ref 7–17)
AST SERPL-CCNC: 15 U/L (ref 10–35)
BILIRUB SERPL-MCNC: 0.8 MG/DL (ref 0.2–1.2)
BUN SERPL-MCNC: 15 MG/DL (ref 7–25)
CALCIUM SERPL-MCNC: 9.4 MG/DL (ref 8.6–10.4)
CHLORIDE SERPL-SCNC: 106 MMOL/L (ref 98–110)
CHOLEST SERPL-MCNC: 136 MG/DL
CHOLEST/HDLC SERPL: 2.2 (CALC)
CO2 SERPL-SCNC: 24 MMOL/L (ref 20–32)
CREAT SERPL-MCNC: 0.76 MG/DL (ref 0.5–1.05)
EGFRCR SERPLBLD CKD-EPI 2021: 88 ML/MIN/1.73M2
EST. AVERAGE GLUCOSE BLD GHB EST-MCNC: 108 MG/DL
EST. AVERAGE GLUCOSE BLD GHB EST-SCNC: 6 MMOL/L
GLUCOSE SERPL-MCNC: 110 MG/DL (ref 65–99)
HBA1C MFR BLD: 5.4 % OF TOTAL HGB
HDLC SERPL-MCNC: 63 MG/DL
LDLC SERPL CALC-MCNC: 60 MG/DL (CALC)
NONHDLC SERPL-MCNC: 73 MG/DL (CALC)
POTASSIUM SERPL-SCNC: 4.3 MMOL/L (ref 3.5–5.3)
PROT SERPL-MCNC: 7.1 G/DL (ref 6.1–8.1)
SODIUM SERPL-SCNC: 138 MMOL/L (ref 135–146)
TRIGL SERPL-MCNC: 54 MG/DL
TSH SERPL-ACNC: 2.88 MIU/L (ref 0.4–4.5)

## 2025-03-14 ENCOUNTER — APPOINTMENT (OUTPATIENT)
Dept: HEMATOLOGY/ONCOLOGY | Facility: CLINIC | Age: 65
End: 2025-03-14
Payer: COMMERCIAL

## 2025-04-07 ENCOUNTER — APPOINTMENT (OUTPATIENT)
Dept: CARDIOLOGY | Facility: CLINIC | Age: 65
End: 2025-04-07
Payer: COMMERCIAL

## 2025-04-14 ENCOUNTER — APPOINTMENT (OUTPATIENT)
Dept: CARDIOLOGY | Facility: CLINIC | Age: 65
End: 2025-04-14
Payer: COMMERCIAL

## 2025-04-23 ENCOUNTER — ANESTHESIA (OUTPATIENT)
Dept: OPERATING ROOM | Facility: HOSPITAL | Age: 65
End: 2025-04-23
Payer: COMMERCIAL

## 2025-04-23 ENCOUNTER — HOSPITAL ENCOUNTER (OUTPATIENT)
Facility: HOSPITAL | Age: 65
Setting detail: OUTPATIENT SURGERY
Discharge: HOME | End: 2025-04-23
Attending: OBSTETRICS & GYNECOLOGY | Admitting: OBSTETRICS & GYNECOLOGY
Payer: COMMERCIAL

## 2025-04-23 ENCOUNTER — ANESTHESIA EVENT (OUTPATIENT)
Dept: OPERATING ROOM | Facility: HOSPITAL | Age: 65
End: 2025-04-23
Payer: COMMERCIAL

## 2025-04-23 VITALS
HEART RATE: 57 BPM | HEIGHT: 67 IN | BODY MASS INDEX: 26.75 KG/M2 | TEMPERATURE: 97.5 F | OXYGEN SATURATION: 97 % | SYSTOLIC BLOOD PRESSURE: 129 MMHG | RESPIRATION RATE: 12 BRPM | WEIGHT: 170.42 LBS | DIASTOLIC BLOOD PRESSURE: 78 MMHG

## 2025-04-23 DIAGNOSIS — N84.0 ENDOMETRIAL POLYP: ICD-10-CM

## 2025-04-23 DIAGNOSIS — G89.18 POST-OPERATIVE PAIN: Primary | ICD-10-CM

## 2025-04-23 DIAGNOSIS — R93.89 THICKENED ENDOMETRIUM: ICD-10-CM

## 2025-04-23 PROCEDURE — 88305 TISSUE EXAM BY PATHOLOGIST: CPT | Performed by: STUDENT IN AN ORGANIZED HEALTH CARE EDUCATION/TRAINING PROGRAM

## 2025-04-23 PROCEDURE — 3600000002 HC OR TIME - INITIAL BASE CHARGE - PROCEDURE LEVEL TWO: Performed by: OBSTETRICS & GYNECOLOGY

## 2025-04-23 PROCEDURE — 2500000005 HC RX 250 GENERAL PHARMACY W/O HCPCS: Performed by: OBSTETRICS & GYNECOLOGY

## 2025-04-23 PROCEDURE — 3700000001 HC GENERAL ANESTHESIA TIME - INITIAL BASE CHARGE: Performed by: OBSTETRICS & GYNECOLOGY

## 2025-04-23 PROCEDURE — 3700000002 HC GENERAL ANESTHESIA TIME - EACH INCREMENTAL 1 MINUTE: Performed by: OBSTETRICS & GYNECOLOGY

## 2025-04-23 PROCEDURE — 7100000002 HC RECOVERY ROOM TIME - EACH INCREMENTAL 1 MINUTE: Performed by: OBSTETRICS & GYNECOLOGY

## 2025-04-23 PROCEDURE — 7100000010 HC PHASE TWO TIME - EACH INCREMENTAL 1 MINUTE: Performed by: OBSTETRICS & GYNECOLOGY

## 2025-04-23 PROCEDURE — 7100000009 HC PHASE TWO TIME - INITIAL BASE CHARGE: Performed by: OBSTETRICS & GYNECOLOGY

## 2025-04-23 PROCEDURE — 88305 TISSUE EXAM BY PATHOLOGIST: CPT | Mod: TC,AHULAB | Performed by: OBSTETRICS & GYNECOLOGY

## 2025-04-23 PROCEDURE — 7100000001 HC RECOVERY ROOM TIME - INITIAL BASE CHARGE: Performed by: OBSTETRICS & GYNECOLOGY

## 2025-04-23 PROCEDURE — 2500000001 HC RX 250 WO HCPCS SELF ADMINISTERED DRUGS (ALT 637 FOR MEDICARE OP): Performed by: OBSTETRICS & GYNECOLOGY

## 2025-04-23 PROCEDURE — 3600000007 HC OR TIME - EACH INCREMENTAL 1 MINUTE - PROCEDURE LEVEL TWO: Performed by: OBSTETRICS & GYNECOLOGY

## 2025-04-23 PROCEDURE — 2500000004 HC RX 250 GENERAL PHARMACY W/ HCPCS (ALT 636 FOR OP/ED): Mod: JZ

## 2025-04-23 PROCEDURE — 2720000007 HC OR 272 NO HCPCS: Performed by: OBSTETRICS & GYNECOLOGY

## 2025-04-23 PROCEDURE — 58558 HYSTEROSCOPY BIOPSY: CPT | Performed by: OBSTETRICS & GYNECOLOGY

## 2025-04-23 RX ORDER — ONDANSETRON HYDROCHLORIDE 2 MG/ML
INJECTION, SOLUTION INTRAVENOUS AS NEEDED
Status: DISCONTINUED | OUTPATIENT
Start: 2025-04-23 | End: 2025-04-23

## 2025-04-23 RX ORDER — PROPOFOL 10 MG/ML
INJECTION, EMULSION INTRAVENOUS AS NEEDED
Status: DISCONTINUED | OUTPATIENT
Start: 2025-04-23 | End: 2025-04-23

## 2025-04-23 RX ORDER — CELECOXIB 200 MG/1
400 CAPSULE ORAL ONCE
Status: COMPLETED | OUTPATIENT
Start: 2025-04-23 | End: 2025-04-23

## 2025-04-23 RX ORDER — DROPERIDOL 2.5 MG/ML
0.62 INJECTION, SOLUTION INTRAMUSCULAR; INTRAVENOUS ONCE AS NEEDED
Status: DISCONTINUED | OUTPATIENT
Start: 2025-04-23 | End: 2025-04-23 | Stop reason: HOSPADM

## 2025-04-23 RX ORDER — KETOROLAC TROMETHAMINE 30 MG/ML
INJECTION, SOLUTION INTRAMUSCULAR; INTRAVENOUS AS NEEDED
Status: DISCONTINUED | OUTPATIENT
Start: 2025-04-23 | End: 2025-04-23

## 2025-04-23 RX ORDER — IBUPROFEN 600 MG/1
600 TABLET ORAL 3 TIMES DAILY PRN
Qty: 60 TABLET | Refills: 0 | Status: SHIPPED | OUTPATIENT
Start: 2025-04-23

## 2025-04-23 RX ORDER — SODIUM CHLORIDE, SODIUM LACTATE, POTASSIUM CHLORIDE, CALCIUM CHLORIDE 600; 310; 30; 20 MG/100ML; MG/100ML; MG/100ML; MG/100ML
INJECTION, SOLUTION INTRAVENOUS CONTINUOUS PRN
Status: DISCONTINUED | OUTPATIENT
Start: 2025-04-23 | End: 2025-04-23

## 2025-04-23 RX ORDER — LIDOCAINE HYDROCHLORIDE 20 MG/ML
INJECTION, SOLUTION INFILTRATION; PERINEURAL AS NEEDED
Status: DISCONTINUED | OUTPATIENT
Start: 2025-04-23 | End: 2025-04-23

## 2025-04-23 RX ORDER — FENTANYL CITRATE 50 UG/ML
INJECTION, SOLUTION INTRAMUSCULAR; INTRAVENOUS AS NEEDED
Status: DISCONTINUED | OUTPATIENT
Start: 2025-04-23 | End: 2025-04-23

## 2025-04-23 RX ORDER — ONDANSETRON HYDROCHLORIDE 2 MG/ML
4 INJECTION, SOLUTION INTRAVENOUS ONCE AS NEEDED
Status: DISCONTINUED | OUTPATIENT
Start: 2025-04-23 | End: 2025-04-23 | Stop reason: HOSPADM

## 2025-04-23 RX ORDER — OXYCODONE HYDROCHLORIDE 5 MG/1
5 TABLET ORAL EVERY 4 HOURS PRN
Status: DISCONTINUED | OUTPATIENT
Start: 2025-04-23 | End: 2025-04-23 | Stop reason: HOSPADM

## 2025-04-23 RX ORDER — SODIUM CHLORIDE 0.9 G/100ML
INJECTION, SOLUTION IRRIGATION AS NEEDED
Status: DISCONTINUED | OUTPATIENT
Start: 2025-04-23 | End: 2025-04-23 | Stop reason: HOSPADM

## 2025-04-23 RX ORDER — MIDAZOLAM HYDROCHLORIDE 1 MG/ML
INJECTION INTRAMUSCULAR; INTRAVENOUS AS NEEDED
Status: DISCONTINUED | OUTPATIENT
Start: 2025-04-23 | End: 2025-04-23

## 2025-04-23 RX ADMIN — KETOROLAC TROMETHAMINE 30 MG: 30 INJECTION, SOLUTION INTRAMUSCULAR at 12:47

## 2025-04-23 RX ADMIN — DEXAMETHASONE SODIUM PHOSPHATE 8 MG: 4 INJECTION, SOLUTION INTRAMUSCULAR; INTRAVENOUS at 12:34

## 2025-04-23 RX ADMIN — SODIUM CHLORIDE, POTASSIUM CHLORIDE, SODIUM LACTATE AND CALCIUM CHLORIDE: 600; 310; 30; 20 INJECTION, SOLUTION INTRAVENOUS at 12:25

## 2025-04-23 RX ADMIN — PROPOFOL 150 MG: 10 INJECTION, EMULSION INTRAVENOUS at 12:30

## 2025-04-23 RX ADMIN — CELECOXIB 400 MG: 200 CAPSULE ORAL at 10:53

## 2025-04-23 RX ADMIN — ONDANSETRON 4 MG: 2 INJECTION, SOLUTION INTRAMUSCULAR; INTRAVENOUS at 12:34

## 2025-04-23 RX ADMIN — LIDOCAINE HYDROCHLORIDE 100 MG: 20 INJECTION, SOLUTION INFILTRATION; PERINEURAL at 12:30

## 2025-04-23 RX ADMIN — MIDAZOLAM HYDROCHLORIDE 2 MG: 1 INJECTION, SOLUTION INTRAMUSCULAR; INTRAVENOUS at 12:25

## 2025-04-23 RX ADMIN — FENTANYL CITRATE 50 MCG: 50 INJECTION, SOLUTION INTRAMUSCULAR; INTRAVENOUS at 12:30

## 2025-04-23 SDOH — HEALTH STABILITY: MENTAL HEALTH: CURRENT SMOKER: 0

## 2025-04-23 ASSESSMENT — PAIN SCALES - GENERAL
PAINLEVEL_OUTOF10: 0 - NO PAIN
PAINLEVEL_OUTOF10: 2
PAINLEVEL_OUTOF10: 3
PAINLEVEL_OUTOF10: 0 - NO PAIN
PAINLEVEL_OUTOF10: 0 - NO PAIN
PAIN_LEVEL: 0
PAINLEVEL_OUTOF10: 0 - NO PAIN

## 2025-04-23 ASSESSMENT — PAIN - FUNCTIONAL ASSESSMENT
PAIN_FUNCTIONAL_ASSESSMENT: 0-10
PAIN_FUNCTIONAL_ASSESSMENT: 0-10
PAIN_FUNCTIONAL_ASSESSMENT: UNABLE TO SELF-REPORT
PAIN_FUNCTIONAL_ASSESSMENT: 0-10

## 2025-04-23 ASSESSMENT — COLUMBIA-SUICIDE SEVERITY RATING SCALE - C-SSRS
1. IN THE PAST MONTH, HAVE YOU WISHED YOU WERE DEAD OR WISHED YOU COULD GO TO SLEEP AND NOT WAKE UP?: NO
2. HAVE YOU ACTUALLY HAD ANY THOUGHTS OF KILLING YOURSELF?: NO
6. HAVE YOU EVER DONE ANYTHING, STARTED TO DO ANYTHING, OR PREPARED TO DO ANYTHING TO END YOUR LIFE?: NO

## 2025-04-23 ASSESSMENT — PAIN DESCRIPTION - DESCRIPTORS: DESCRIPTORS: ACHING

## 2025-04-23 NOTE — ANESTHESIA PREPROCEDURE EVALUATION
"  Patient: Marian Garcia    Procedure Information       Date/Time: 04/23/25 1130    Procedure: D & C Hysteroscopy (Pelvis)    Location: [facility] A OR 06 / [facility] A OR    Surgeons: Sung Simental MD            Relevant Problems   Anesthesia (within normal limits)      Cardiac   (+) Abnormal EKG   (+) Hyperlipidemia      Pulmonary (within normal limits)      Neuro (within normal limits)      GI (within normal limits)      /Renal   (+) Recurrent UTI      Liver (within normal limits)      Endocrine   (+) Hyperthyroidism      Musculoskeletal (within normal limits)      HEENT (within normal limits)      ID   (+) Recurrent UTI       Clinical information reviewed:   Tobacco  Allergies  Meds   Med Hx  Surg Hx   Fam Hx  Soc Hx         Medical History[1]   Surgical History[2]  Social History[3]   Current Outpatient Medications   Medication Instructions    cyanocobalamin (VITAMIN B-12) 1,000 mcg, oral, 3 times weekly    ibuprofen 600 mg, oral, 3 times daily PRN    rosuvastatin (CRESTOR) 5 mg, oral, Daily      RX Allergies[4]     Chemistry    Lab Results   Component Value Date/Time     03/12/2025 1027    K 4.3 03/12/2025 1027     03/12/2025 1027    CO2 24 03/12/2025 1027    BUN 15 03/12/2025 1027    CREATININE 0.76 03/12/2025 1027    Lab Results   Component Value Date/Time    CALCIUM 9.4 03/12/2025 1027    ALKPHOS 57 03/12/2025 1027    AST 15 03/12/2025 1027    ALT 14 03/12/2025 1027    BILITOT 0.8 03/12/2025 1027          Lab Results   Component Value Date    HGBA1C 5.4 03/12/2025     Lab Results   Component Value Date/Time    WBC 4.2 (L) 01/14/2025 0913    HGB 13.8 01/14/2025 0913    HCT 43.1 01/14/2025 0913     01/14/2025 0913     Lab Results   Component Value Date/Time    PROTIME 10.9 10/23/2024 1350    INR 1.0 10/23/2024 1350     No results found for: \"ABORH\"  No results found for this or any previous visit (from the past 4464 hours).  No results found for this or any previous visit from the past 1095 " "days.       Visit Vitals  /81 (BP Location: Right arm)   Pulse 54   Temp 36.2 °C (97.2 °F) (Temporal)   Resp 14   Ht 1.702 m (5' 7\")   Wt 77.3 kg (170 lb 6.7 oz)   SpO2 98%   BMI 26.69 kg/m²   OB Status Postmenopausal   Smoking Status Never   BSA 1.91 m²     NPO/Void Status  Carbohydrate Drink Given Prior to Surgery? : N  Date of Last Liquid: 04/23/25  Time of Last Liquid: 0700  Date of Last Solid: 04/22/25  Time of Last Solid: 2000  Last Intake Type: Clear fluids  Time of Last Void: 1048        Physical Exam    Airway  Mallampati: II  TM distance: >3 FB  Neck ROM: full  Mouth opening: 3 or more finger widths     Cardiovascular   Rhythm: regular     Dental - normal exam     Pulmonary Breath sounds clear to auscultation     Abdominal            Anesthesia Plan    History of general anesthesia?: no  History of complications of general anesthesia?: no    ASA 2     general     The patient is not a current smoker.    intravenous induction   Postoperative pain plan includes opioids.  Trial extubation is planned.  Anesthetic plan and risks discussed with patient.  Use of blood products discussed with patient who.           [1]   Past Medical History:  Diagnosis Date    Acute pharyngitis, unspecified 06/08/2020    Sore throat in the morning    Change in bowel habit 12/24/2019    Change in bowel habits    Encounter for screening mammogram for malignant neoplasm of breast     Other screening mammogram    Other cervical disc degeneration, unspecified cervical region 09/17/2015    DDD (degenerative disc disease), cervical    Other fatigue 04/01/2021    Other fatigue    Other intervertebral disc degeneration, lumbar region 09/17/2015    DDD (degenerative disc disease), lumbar    Personal history of other diseases of the musculoskeletal system and connective tissue 02/03/2015    History of low back pain    Personal history of other diseases of the musculoskeletal system and connective tissue 02/03/2015    History of neck " pain    Personal history of other diseases of the nervous system and sense organs 01/19/2018    History of blurred vision    Personal history of other endocrine, nutritional and metabolic disease 09/14/2015    History of hyperlipidemia    Personal history of other endocrine, nutritional and metabolic disease 04/01/2021    History of hyperthyroidism    Personal history of other specified conditions 04/01/2021    History of exercise intolerance    Personal history of other specified conditions 04/01/2021    History of weight loss    Personal history of other specified conditions 04/01/2021    History of shortness of breath    Personal history of other specified conditions 02/12/2018    History of urinary frequency    Personal history of other specified conditions 08/31/2017    History of vertigo    Spondylosis without myelopathy or radiculopathy, cervical region 08/31/2017    DJD (degenerative joint disease) of cervical spine    Spondylosis without myelopathy or radiculopathy, lumbar region 08/31/2017    DJD (degenerative joint disease), lumbar    Unspecified symptoms and signs involving the genitourinary system 05/27/2020    UTI symptoms   [2]   Past Surgical History:  Procedure Laterality Date    COLONOSCOPY  02/24/2015    Colonoscopy (Fiberoptic)   [3]   Social History  Tobacco Use    Smoking status: Never    Smokeless tobacco: Never   Substance Use Topics    Alcohol use: Not Currently    Drug use: Never   [4] No Known Allergies

## 2025-04-23 NOTE — H&P
History Of Present Illness  Marian Garcia is a 64 y.o. female presenting with history of thickened endometrium.  No vaginal bleeding.  Incidental finding.     Past Medical History  She has a past medical history of Acute pharyngitis, unspecified (06/08/2020), Change in bowel habit (12/24/2019), Encounter for screening mammogram for malignant neoplasm of breast, Other cervical disc degeneration, unspecified cervical region (09/17/2015), Other fatigue (04/01/2021), Other intervertebral disc degeneration, lumbar region (09/17/2015), Personal history of other diseases of the musculoskeletal system and connective tissue (02/03/2015), Personal history of other diseases of the musculoskeletal system and connective tissue (02/03/2015), Personal history of other diseases of the nervous system and sense organs (01/19/2018), Personal history of other endocrine, nutritional and metabolic disease (09/14/2015), Personal history of other endocrine, nutritional and metabolic disease (04/01/2021), Personal history of other specified conditions (04/01/2021), Personal history of other specified conditions (04/01/2021), Personal history of other specified conditions (04/01/2021), Personal history of other specified conditions (02/12/2018), Personal history of other specified conditions (08/31/2017), Spondylosis without myelopathy or radiculopathy, cervical region (08/31/2017), Spondylosis without myelopathy or radiculopathy, lumbar region (08/31/2017), and Unspecified symptoms and signs involving the genitourinary system (05/27/2020).    Surgical History  She has a past surgical history that includes Colonoscopy (02/24/2015).     Social History  She reports that she has never smoked. She has never used smokeless tobacco. She reports that she does not currently use alcohol. She reports that she does not use drugs.    Family History  Family History[1]     Allergies  Patient has no known allergies.    Review of Systems  Negative review of  "systems    Physical Exam  Vitals reviewed.   Constitutional:       Appearance: Normal appearance. She is normal weight.   Cardiovascular:      Rate and Rhythm: Normal rate and regular rhythm.   Pulmonary:      Effort: Pulmonary effort is normal.      Breath sounds: Normal breath sounds.   Abdominal:      General: Abdomen is flat. Bowel sounds are normal. There is no distension.      Palpations: Abdomen is soft. There is no mass.      Tenderness: There is no abdominal tenderness.   Genitourinary:     Comments: Deferred.  Office progress note reviewed.  Ultrasound reviewed  Neurological:      General: No focal deficit present.      Mental Status: She is alert.   Psychiatric:         Mood and Affect: Mood normal.         Behavior: Behavior normal.         Thought Content: Thought content normal.         Judgment: Judgment normal.          Last Recorded Vitals  Blood pressure 114/74, pulse 67, temperature 36.4 °C (97.5 °F), temperature source Temporal, resp. rate 16, height 1.702 m (5' 7\"), weight 77.3 kg (170 lb 6.7 oz), SpO2 98%.    Relevant Results      Scheduled medications  Scheduled Medications[2]  Continuous medications  Continuous Medications[3]  PRN medications  PRN Medications[4]  No results found for this or any previous visit (from the past 24 hours).    Labs and ultrasound reviewed    Assessment/Plan   Assessment & Plan  Thickened endometrium      Plan on diagnostic hysteroscopy D&C risks and benefits reviewed again with patient and spouse         Sung Simental MD         [1] No family history on file.  [2] [3] [4]   "

## 2025-04-23 NOTE — OP NOTE
D & C Hysteroscopy Operative Note     Date: 2025  OR Location: [facility] OR    Name: Marian Garcia, : 1960, Age: 64 y.o., MRN: [id number], Sex: female    Diagnosis  Pre-op Diagnosis      * Thickened endometrium [R93.89] Post-op Diagnosis     * Thickened endometrium [R93.89]     * Endometrial polyp [N84.0]     Procedures  D & C Hysteroscopy  54221 - AR HYSTEROSCOPY BX ENDOMETRIUM&/POLYPC W/WO D&C      Surgeons      * Sung Simental - Primary    Resident/Fellow/Other Assistant:  Surgeons and Role:  * No surgeons found with a matching role *    Staff:   Circulator: Latonya Jaffe Person: Lorraine  Circulator: Rocio    Anesthesia Staff: Anesthesiologist: Rena Knutson MD  CRNA: NOVA Pollock-SOLE  SRNA: Kami Victor M    Procedure Summary  Anesthesia: General  ASA: II  Estimated Blood Loss: 10 mL  Intra-op Medications:   Administrations occurring from 1130 to 1245 on 25:   Medication Name Total Dose   sodium chloride 0.9 % irrigation solution 3,000 mL   dexAMETHasone (Decadron) injection 4 mg/mL 8 mg   fentaNYL (Sublimaze) injection 50 mcg/mL 50 mcg   lactated Ringer's infusion Cannot be calculated   lidocaine (Xylocaine) injection 2 % 100 mg   midazolam PF (Versed) injection 1 mg/mL 2 mg   ondansetron (Zofran) 2 mg/mL injection 4 mg   propofol (Diprivan) injection 10 mg/mL 150 mg              Anesthesia Record               Intraprocedure I/O Totals          Intake    lactated Ringer's 500.00 mL    Total Intake 500 mL          Specimen:   ID Type Source Tests Collected by Time   1 : ENDOMETRIAL CURRETINGS WITH POLYPECTOMY Tissue ENDOMETRIUM CURETTINGS SURGICAL PATHOLOGY EXAM Sung Simental MD 2025 1235                 Drains and/or Catheters: * None in log *    Tourniquet Times:         Implants:     Findings: Uterus normal size endometrium thin.  Large endometrial polyp    Indications: Marian Garcia is an 64 y.o. female who is having surgery for Thickened endometrium [R93.89].     The patient was seen  in the preoperative area. The risks, benefits, complications, treatment options, non-operative alternatives, expected recovery and outcomes were discussed with the patient. The possibilities of reaction to medication, pulmonary aspiration, injury to surrounding structures, bleeding, recurrent infection, the need for additional procedures, failure to diagnose a condition, and creating a complication requiring transfusion or operation were discussed with the patient. The patient concurred with the proposed plan, giving informed consent.  The site of surgery was properly noted/marked if necessary per policy. The patient has been actively warmed in preoperative area. Preoperative antibiotics are not indicated. Venous thrombosis prophylaxis are not indicated.    Procedure Details: Under satisfactory anesthesia patient was placed in modified dorsolithotomy position.  Vaginal perineal abdominal prep was carried out patient was draped.  Weighted speculum is placed in the anterior lip of cervix grasped with single-tooth tenaculum.  Cervix easily dilated and a diagnostic hysteroscope was placed in position.  Using a liquid medium next inspection of the uterine cavity is carried out with us or identified a large polyp was noted extending from the fundus down to almost the cervix both ostium were identified and clear another small polyp was noted near the right ostium.  Resector was placed in position and the polyps were subsequently removed without difficulty no defects or perforations were noted.  Sharp curettage was then carried out with small amount of tissue obtained and sent for pathology as well.  No defects or perforations were noted.  At this point procedure was terminated estimated blood loss  With less than 10 mL patient was recovered in good condition end of dictation  Evidence of Infection: No   Complications:  None; patient tolerated the procedure well.    Disposition: PACU - hemodynamically stable.  Condition:  stable     Attending Attestation: I performed the procedure.    Sung Simental  Phone Number: [phone]

## 2025-04-23 NOTE — DISCHARGE INSTRUCTIONS
Call for fever or chills temperature over 100  Call for persistent nausea and vomiting  Call for pain that exceeds home medication  Call for bleeding more than 1 pad per hour  Pelvic rest 72 hours

## 2025-04-23 NOTE — ANESTHESIA POSTPROCEDURE EVALUATION
Patient: Marian Garcia    Procedure Summary       Date: 04/23/25 Room / Location: [facility] OR 06 / [facility] OR    Anesthesia Start: 1225 Anesthesia Stop: 1259    Procedure: D & C Hysteroscopy (Pelvis) Diagnosis:       Thickened endometrium      Endometrial polyp      (Thickened endometrium [R93.89])    Surgeons: Sung Simental MD Responsible Provider: Rena Knutson MD    Anesthesia Type: general ASA Status: 2            Anesthesia Type: general    Vitals Value Taken Time   /78 04/23/25 13:45   Temp 36.4 °C (97.5 °F) 04/23/25 13:45   Pulse 57 04/23/25 13:45   Resp 12 04/23/25 13:45   SpO2 97 % 04/23/25 13:45       Anesthesia Post Evaluation    Patient location during evaluation: PACU  Patient participation: complete - patient participated  Level of consciousness: awake  Pain score: 0  Pain management: adequate  Airway patency: patent  Cardiovascular status: hemodynamically stable  Respiratory status: acceptable  Hydration status: acceptable  Postoperative Nausea and Vomiting: none        There were no known notable events for this encounter.

## 2025-04-23 NOTE — ANESTHESIA PROCEDURE NOTES
Airway  Date/Time: 4/23/2025 12:31 PM  Reason: elective    Airway not difficult    Staffing  Performed: DYLAN   Authorized by: Rena Knutson MD    Performed by: Kami Dow  Patient location during procedure: OR    Patient Condition  Indications for airway management: anesthesia and airway protection  Patient position: sniffing  MILS maintained throughout  Planned trial extubation  Sedation level: deep     Final Airway Details   Preoxygenated: yes  Final airway type: supraglottic airway  Successful airway: Supraglottic airway: iGel.  Size: 4   Ventilation between attempts: none  Number of attempts at approach: 1

## 2025-04-24 ASSESSMENT — PAIN SCALES - GENERAL: PAINLEVEL_OUTOF10: 5 - MODERATE PAIN

## 2025-05-02 LAB
LABORATORY COMMENT REPORT: NORMAL
PATH REPORT.FINAL DX SPEC: NORMAL
PATH REPORT.GROSS SPEC: NORMAL
PATH REPORT.RELEVANT HX SPEC: NORMAL
PATH REPORT.TOTAL CANCER: NORMAL

## 2025-06-13 DIAGNOSIS — R73.02 IGT (IMPAIRED GLUCOSE TOLERANCE): ICD-10-CM

## 2025-08-18 DIAGNOSIS — D69.6 THROMBOCYTOPENIA: ICD-10-CM

## 2025-08-18 DIAGNOSIS — T14.8XXA BRUISE: ICD-10-CM

## 2025-08-18 DIAGNOSIS — R19.7 DIARRHEA, UNSPECIFIED TYPE: ICD-10-CM

## 2025-08-18 DIAGNOSIS — R10.9 ABDOMINAL PAIN, UNSPECIFIED ABDOMINAL LOCATION: ICD-10-CM

## 2025-08-18 DIAGNOSIS — D72.819 LEUKOPENIA, UNSPECIFIED TYPE: ICD-10-CM

## 2025-08-28 PROCEDURE — 36415 COLL VENOUS BLD VENIPUNCTURE: CPT

## 2025-08-29 ENCOUNTER — LAB (OUTPATIENT)
Dept: LAB | Facility: HOSPITAL | Age: 65
End: 2025-08-29
Payer: COMMERCIAL

## 2025-08-29 LAB
25(OH)D3 SERPL-MCNC: 35 NG/ML (ref 30–100)
ALBUMIN SERPL BCP-MCNC: 4.7 G/DL (ref 3.4–5)
ALP SERPL-CCNC: 56 U/L (ref 33–136)
ALT SERPL W P-5'-P-CCNC: 18 U/L (ref 7–45)
ANION GAP SERPL CALC-SCNC: 12 MMOL/L (ref 10–20)
AST SERPL W P-5'-P-CCNC: 19 U/L (ref 9–39)
BASOPHILS # BLD AUTO: 0.02 X10*3/UL (ref 0–0.1)
BASOPHILS NFR BLD AUTO: 0.5 %
BILIRUB SERPL-MCNC: 0.7 MG/DL (ref 0–1.2)
BUN SERPL-MCNC: 17 MG/DL (ref 6–23)
CALCIUM SERPL-MCNC: 9.3 MG/DL (ref 8.6–10.6)
CHLORIDE SERPL-SCNC: 106 MMOL/L (ref 98–107)
CO2 SERPL-SCNC: 26 MMOL/L (ref 21–32)
CREAT SERPL-MCNC: 0.7 MG/DL (ref 0.5–1.05)
EGFRCR SERPLBLD CKD-EPI 2021: >90 ML/MIN/1.73M*2
EOSINOPHIL # BLD AUTO: 0.04 X10*3/UL (ref 0–0.7)
EOSINOPHIL NFR BLD AUTO: 1 %
ERYTHROCYTE [DISTWIDTH] IN BLOOD BY AUTOMATED COUNT: 13 % (ref 11.5–14.5)
FERRITIN SERPL-MCNC: 141 NG/ML (ref 8–150)
FOLATE SERPL-MCNC: 17.8 NG/ML
GLUCOSE SERPL-MCNC: 101 MG/DL (ref 74–99)
HCT VFR BLD AUTO: 44.3 % (ref 36–46)
HGB BLD-MCNC: 13.7 G/DL (ref 12–16)
IMM GRANULOCYTES # BLD AUTO: 0.01 X10*3/UL (ref 0–0.7)
IMM GRANULOCYTES NFR BLD AUTO: 0.2 % (ref 0–0.9)
IRON SATN MFR SERPL: 34 % (ref 25–45)
IRON SERPL-MCNC: 116 UG/DL (ref 35–150)
LYMPHOCYTES # BLD AUTO: 1.45 X10*3/UL (ref 1.2–4.8)
LYMPHOCYTES NFR BLD AUTO: 36.1 %
MCH RBC QN AUTO: 31.6 PG (ref 26–34)
MCHC RBC AUTO-ENTMCNC: 30.9 G/DL (ref 32–36)
MCV RBC AUTO: 102 FL (ref 80–100)
MONOCYTES # BLD AUTO: 0.24 X10*3/UL (ref 0.1–1)
MONOCYTES NFR BLD AUTO: 6 %
NEUTROPHILS # BLD AUTO: 2.26 X10*3/UL (ref 1.2–7.7)
NEUTROPHILS NFR BLD AUTO: 56.2 %
NRBC BLD-RTO: 0 /100 WBCS (ref 0–0)
PLATELET # BLD AUTO: 211 X10*3/UL (ref 150–450)
POTASSIUM SERPL-SCNC: 4.2 MMOL/L (ref 3.5–5.3)
PROT SERPL-MCNC: 7 G/DL (ref 6.4–8.2)
RBC # BLD AUTO: 4.33 X10*6/UL (ref 4–5.2)
SODIUM SERPL-SCNC: 140 MMOL/L (ref 136–145)
TIBC SERPL-MCNC: 344 UG/DL (ref 240–445)
UIBC SERPL-MCNC: 228 UG/DL (ref 110–370)
VIT B12 SERPL-MCNC: 487 PG/ML (ref 211–911)
WBC # BLD AUTO: 4 X10*3/UL (ref 4.4–11.3)

## 2025-09-02 ENCOUNTER — TELEMEDICINE (OUTPATIENT)
Dept: HEMATOLOGY/ONCOLOGY | Facility: CLINIC | Age: 65
End: 2025-09-02
Payer: COMMERCIAL

## 2025-09-02 DIAGNOSIS — R19.7 DIARRHEA, UNSPECIFIED TYPE: ICD-10-CM

## 2025-09-02 DIAGNOSIS — D72.819 LEUKOPENIA, UNSPECIFIED TYPE: ICD-10-CM

## 2025-09-02 DIAGNOSIS — E53.8 B12 DEFICIENCY: ICD-10-CM

## 2025-09-02 DIAGNOSIS — T14.8XXA BRUISE: ICD-10-CM

## 2025-09-02 DIAGNOSIS — D69.6 THROMBOCYTOPENIA: ICD-10-CM

## 2025-09-02 DIAGNOSIS — R10.9 ABDOMINAL PAIN, UNSPECIFIED ABDOMINAL LOCATION: ICD-10-CM

## 2025-09-02 PROCEDURE — 99215 OFFICE O/P EST HI 40 MIN: CPT

## 2025-09-02 RX ORDER — LANOLIN ALCOHOL/MO/W.PET/CERES
1000 CREAM (GRAM) TOPICAL DAILY
Qty: 30 TABLET | Refills: 2 | Status: SHIPPED | OUTPATIENT
Start: 2025-09-02

## 2026-03-16 ENCOUNTER — APPOINTMENT (OUTPATIENT)
Dept: PRIMARY CARE | Facility: CLINIC | Age: 66
End: 2026-03-16
Payer: COMMERCIAL

## (undated) DEVICE — GLOVE, SURGICAL, PROTEXIS PI W/NEU-THERA, 7.5, PF, LF

## (undated) DEVICE — ACCESSORY, AVETA, WASTE MANAGEMENT WITH CAP

## (undated) DEVICE — SOLUTION, SCRUB EXIDINE, 4% CHG, 4 OZ

## (undated) DEVICE — HYSTEROSCOPE, AVETA CORAL, 4.6MM

## (undated) DEVICE — ACCESSORY, FLUID MANAGEMENT, AVETA

## (undated) DEVICE — PAD, SANITARY, OBSTETRICAL, W/ADHSV STRIP,11 IN,LF

## (undated) DEVICE — Device

## (undated) DEVICE — DEVICE, RESECTING, AVETA FLEX, 2.9MM